# Patient Record
Sex: FEMALE | Race: WHITE | NOT HISPANIC OR LATINO
[De-identification: names, ages, dates, MRNs, and addresses within clinical notes are randomized per-mention and may not be internally consistent; named-entity substitution may affect disease eponyms.]

---

## 2017-02-03 ENCOUNTER — RESULT REVIEW (OUTPATIENT)
Age: 62
End: 2017-02-03

## 2017-05-06 ENCOUNTER — APPOINTMENT (OUTPATIENT)
Dept: INTERNAL MEDICINE | Facility: CLINIC | Age: 62
End: 2017-05-06

## 2017-05-07 LAB
25(OH)D3 SERPL-MCNC: 40.7 NG/ML
ALBUMIN SERPL ELPH-MCNC: 4.6 G/DL
ALP BLD-CCNC: 59 U/L
ALT SERPL-CCNC: 13 U/L
AST SERPL-CCNC: 20 U/L
BASOPHILS # BLD AUTO: 0.01 K/UL
BASOPHILS NFR BLD AUTO: 0.2 %
BILIRUB DIRECT SERPL-MCNC: 0.1 MG/DL
BILIRUB INDIRECT SERPL-MCNC: 0.2 MG/DL
BILIRUB SERPL-MCNC: 0.3 MG/DL
CHOLEST SERPL-MCNC: 234 MG/DL
CHOLEST/HDLC SERPL: 2.6 RATIO
CRP SERPL HS-MCNC: <0.2 MG/L
EOSINOPHIL # BLD AUTO: 0.07 K/UL
EOSINOPHIL NFR BLD AUTO: 1.3 %
HCT VFR BLD CALC: 40 %
HDLC SERPL-MCNC: 91 MG/DL
HGB BLD-MCNC: 13.2 G/DL
IMM GRANULOCYTES NFR BLD AUTO: 0.4 %
LDLC SERPL CALC-MCNC: 136 MG/DL
LYMPHOCYTES # BLD AUTO: 1.61 K/UL
LYMPHOCYTES NFR BLD AUTO: 30.8 %
MAN DIFF?: NORMAL
MCHC RBC-ENTMCNC: 30 PG
MCHC RBC-ENTMCNC: 33 GM/DL
MCV RBC AUTO: 90.9 FL
MONOCYTES # BLD AUTO: 0.42 K/UL
MONOCYTES NFR BLD AUTO: 8 %
NEUTROPHILS # BLD AUTO: 3.1 K/UL
NEUTROPHILS NFR BLD AUTO: 59.3 %
PLATELET # BLD AUTO: 207 K/UL
PROT SERPL-MCNC: 7.3 G/DL
RBC # BLD: 4.4 M/UL
RBC # FLD: 13.7 %
TRIGL SERPL-MCNC: 37 MG/DL
TSH SERPL-ACNC: 2.18 UIU/ML
WBC # FLD AUTO: 5.23 K/UL

## 2017-05-08 ENCOUNTER — OTHER (OUTPATIENT)
Age: 62
End: 2017-05-08

## 2017-08-09 ENCOUNTER — RX RENEWAL (OUTPATIENT)
Age: 62
End: 2017-08-09

## 2017-09-22 ENCOUNTER — APPOINTMENT (OUTPATIENT)
Dept: INTERNAL MEDICINE | Facility: CLINIC | Age: 62
End: 2017-09-22
Payer: COMMERCIAL

## 2017-09-22 VITALS
OXYGEN SATURATION: 99 % | DIASTOLIC BLOOD PRESSURE: 60 MMHG | BODY MASS INDEX: 20.22 KG/M2 | HEART RATE: 60 BPM | SYSTOLIC BLOOD PRESSURE: 80 MMHG | WEIGHT: 103 LBS | HEIGHT: 59.84 IN | TEMPERATURE: 98.5 F

## 2017-09-22 PROCEDURE — 90471 IMMUNIZATION ADMIN: CPT

## 2017-09-22 PROCEDURE — 99396 PREV VISIT EST AGE 40-64: CPT | Mod: 25

## 2017-09-22 PROCEDURE — 90715 TDAP VACCINE 7 YRS/> IM: CPT

## 2017-10-02 ENCOUNTER — APPOINTMENT (OUTPATIENT)
Dept: INTERNAL MEDICINE | Facility: CLINIC | Age: 62
End: 2017-10-02
Payer: COMMERCIAL

## 2017-10-02 PROCEDURE — G0008: CPT

## 2017-10-02 PROCEDURE — 90686 IIV4 VACC NO PRSV 0.5 ML IM: CPT

## 2017-12-09 ENCOUNTER — MOBILE ON CALL (OUTPATIENT)
Age: 62
End: 2017-12-09

## 2017-12-29 DIAGNOSIS — N60.19 DIFFUSE CYSTIC MASTOPATHY OF UNSPECIFIED BREAST: ICD-10-CM

## 2018-03-10 ENCOUNTER — RESULT REVIEW (OUTPATIENT)
Age: 63
End: 2018-03-10

## 2018-06-19 ENCOUNTER — RESULT REVIEW (OUTPATIENT)
Age: 63
End: 2018-06-19

## 2018-08-10 ENCOUNTER — MEDICATION RENEWAL (OUTPATIENT)
Age: 63
End: 2018-08-10

## 2018-10-19 ENCOUNTER — APPOINTMENT (OUTPATIENT)
Dept: INTERNAL MEDICINE | Facility: CLINIC | Age: 63
End: 2018-10-19
Payer: COMMERCIAL

## 2018-10-19 VITALS — WEIGHT: 104 LBS | BODY MASS INDEX: 21.01 KG/M2

## 2018-10-19 VITALS
TEMPERATURE: 97.9 F | HEART RATE: 74 BPM | OXYGEN SATURATION: 97 % | HEIGHT: 59 IN | DIASTOLIC BLOOD PRESSURE: 56 MMHG | SYSTOLIC BLOOD PRESSURE: 84 MMHG

## 2018-10-19 PROCEDURE — G0008: CPT

## 2018-10-19 PROCEDURE — 90686 IIV4 VACC NO PRSV 0.5 ML IM: CPT

## 2018-10-19 PROCEDURE — 99396 PREV VISIT EST AGE 40-64: CPT | Mod: 25

## 2018-10-19 PROCEDURE — 36415 COLL VENOUS BLD VENIPUNCTURE: CPT

## 2018-10-19 RX ORDER — SCOPALAMINE 1 MG/3D
1 PATCH, EXTENDED RELEASE TRANSDERMAL
Qty: 2 | Refills: 0 | Status: DISCONTINUED | COMMUNITY
Start: 2018-06-19 | End: 2018-10-19

## 2018-10-19 NOTE — PHYSICAL EXAM
[No Acute Distress] : no acute distress [Well-Appearing] : well-appearing [Normal Sclera/Conjunctiva] : normal sclera/conjunctiva [Normal Outer Ear/Nose] : the outer ears and nose were normal in appearance [Normal TMs] : both tympanic membranes were normal [Supple] : supple [No Respiratory Distress] : no respiratory distress  [Clear to Auscultation] : lungs were clear to auscultation bilaterally [Normal Rate] : normal rate  [Regular Rhythm] : with a regular rhythm [Normal S1, S2] : normal S1 and S2 [No Murmur] : no murmur heard [No Edema] : there was no peripheral edema [Soft] : abdomen soft [Non Tender] : non-tender [Normal Supraclavicular Nodes] : no supraclavicular lymphadenopathy [Normal Anterior Cervical Nodes] : no anterior cervical lymphadenopathy [Normal Affect] : the affect was normal [Normal Mood] : the mood was normal [de-identified] : fair and freckled

## 2018-10-19 NOTE — ASSESSMENT
[FreeTextEntry1] : Healthy 64 y/o female for CPE\par \par Labs today, flu shot today\par \par Advised derm for SBE\par \par up to date on CLS, mammo\par \par \par yearly sono for hemangioma and angiomyolipoma.

## 2018-10-19 NOTE — HISTORY OF PRESENT ILLNESS
[FreeTextEntry1] : Here for cpe [de-identified] : No complaints. would like a flu shot\par \par Had bita sofia in Feb 2018\par \par No change in hepatic hemangioma. was found to have a angiomyolipoma on the left, unchanged

## 2018-10-22 LAB
25(OH)D3 SERPL-MCNC: 45.3 NG/ML
ALBUMIN SERPL ELPH-MCNC: 4.9 G/DL
ALP BLD-CCNC: 63 U/L
ALT SERPL-CCNC: 24 U/L
ANION GAP SERPL CALC-SCNC: 15 MMOL/L
AST SERPL-CCNC: 23 U/L
BASOPHILS # BLD AUTO: 0.02 K/UL
BASOPHILS NFR BLD AUTO: 0.3 %
BILIRUB SERPL-MCNC: 0.3 MG/DL
BUN SERPL-MCNC: 14 MG/DL
CALCIUM SERPL-MCNC: 10.3 MG/DL
CHLORIDE SERPL-SCNC: 106 MMOL/L
CHOLEST SERPL-MCNC: 249 MG/DL
CHOLEST/HDLC SERPL: 2.7 RATIO
CO2 SERPL-SCNC: 26 MMOL/L
CREAT SERPL-MCNC: 0.66 MG/DL
EOSINOPHIL # BLD AUTO: 0.07 K/UL
EOSINOPHIL NFR BLD AUTO: 1.1 %
GLUCOSE SERPL-MCNC: 120 MG/DL
HBA1C MFR BLD HPLC: 5.6 %
HCT VFR BLD CALC: 40.8 %
HDLC SERPL-MCNC: 92 MG/DL
HGB BLD-MCNC: 13.5 G/DL
IMM GRANULOCYTES NFR BLD AUTO: 0.3 %
LDLC SERPL CALC-MCNC: 148 MG/DL
LYMPHOCYTES # BLD AUTO: 1.44 K/UL
LYMPHOCYTES NFR BLD AUTO: 22.8 %
MAN DIFF?: NORMAL
MCHC RBC-ENTMCNC: 29.8 PG
MCHC RBC-ENTMCNC: 33.1 GM/DL
MCV RBC AUTO: 90.1 FL
MONOCYTES # BLD AUTO: 0.27 K/UL
MONOCYTES NFR BLD AUTO: 4.3 %
NEUTROPHILS # BLD AUTO: 4.5 K/UL
NEUTROPHILS NFR BLD AUTO: 71.2 %
PLATELET # BLD AUTO: 231 K/UL
POTASSIUM SERPL-SCNC: 4.8 MMOL/L
PROT SERPL-MCNC: 7.3 G/DL
RBC # BLD: 4.53 M/UL
RBC # FLD: 13.8 %
SODIUM SERPL-SCNC: 147 MMOL/L
TRIGL SERPL-MCNC: 47 MG/DL
TSH SERPL-ACNC: 1.89 UIU/ML
WBC # FLD AUTO: 6.32 K/UL

## 2019-01-05 ENCOUNTER — APPOINTMENT (OUTPATIENT)
Dept: INTERNAL MEDICINE | Facility: CLINIC | Age: 64
End: 2019-01-05
Payer: COMMERCIAL

## 2019-01-05 PROCEDURE — 86580 TB INTRADERMAL TEST: CPT

## 2019-06-28 ENCOUNTER — OTHER (OUTPATIENT)
Age: 64
End: 2019-06-28

## 2019-07-05 ENCOUNTER — TRANSCRIPTION ENCOUNTER (OUTPATIENT)
Age: 64
End: 2019-07-05

## 2019-07-06 ENCOUNTER — EMERGENCY (EMERGENCY)
Facility: HOSPITAL | Age: 64
LOS: 1 days | Discharge: HOME | End: 2019-07-06
Attending: EMERGENCY MEDICINE
Payer: COMMERCIAL

## 2019-07-06 VITALS
DIASTOLIC BLOOD PRESSURE: 75 MMHG | WEIGHT: 104.94 LBS | RESPIRATION RATE: 18 BRPM | TEMPERATURE: 98 F | HEART RATE: 69 BPM | SYSTOLIC BLOOD PRESSURE: 120 MMHG | OXYGEN SATURATION: 99 % | HEIGHT: 60 IN

## 2019-07-06 LAB
ALBUMIN SERPL ELPH-MCNC: 4.4 G/DL — SIGNIFICANT CHANGE UP (ref 3.3–5)
ALP SERPL-CCNC: 58 U/L — SIGNIFICANT CHANGE UP (ref 40–120)
ALT FLD-CCNC: 12 U/L — SIGNIFICANT CHANGE UP (ref 10–45)
ANION GAP SERPL CALC-SCNC: 16 MMOL/L — SIGNIFICANT CHANGE UP (ref 5–17)
AST SERPL-CCNC: 15 U/L — SIGNIFICANT CHANGE UP (ref 10–40)
BASOPHILS # BLD AUTO: 0 K/UL — SIGNIFICANT CHANGE UP (ref 0–0.2)
BASOPHILS NFR BLD AUTO: 0.2 % — SIGNIFICANT CHANGE UP (ref 0–2)
BILIRUB SERPL-MCNC: 0.2 MG/DL — SIGNIFICANT CHANGE UP (ref 0.2–1.2)
BUN SERPL-MCNC: 20 MG/DL — SIGNIFICANT CHANGE UP (ref 7–23)
CALCIUM SERPL-MCNC: 9.6 MG/DL — SIGNIFICANT CHANGE UP (ref 8.4–10.5)
CHLORIDE SERPL-SCNC: 101 MMOL/L — SIGNIFICANT CHANGE UP (ref 96–108)
CO2 SERPL-SCNC: 21 MMOL/L — LOW (ref 22–31)
CREAT SERPL-MCNC: 0.84 MG/DL — SIGNIFICANT CHANGE UP (ref 0.5–1.3)
EOSINOPHIL # BLD AUTO: 0.1 K/UL — SIGNIFICANT CHANGE UP (ref 0–0.5)
EOSINOPHIL NFR BLD AUTO: 1.1 % — SIGNIFICANT CHANGE UP (ref 0–6)
GLUCOSE SERPL-MCNC: 132 MG/DL — HIGH (ref 70–99)
HCT VFR BLD CALC: 39.7 % — SIGNIFICANT CHANGE UP (ref 34.5–45)
HGB BLD-MCNC: 13.5 G/DL — SIGNIFICANT CHANGE UP (ref 11.5–15.5)
LYMPHOCYTES # BLD AUTO: 1.4 K/UL — SIGNIFICANT CHANGE UP (ref 1–3.3)
LYMPHOCYTES # BLD AUTO: 20.9 % — SIGNIFICANT CHANGE UP (ref 13–44)
MAGNESIUM SERPL-MCNC: 1.8 MG/DL — SIGNIFICANT CHANGE UP (ref 1.6–2.6)
MCHC RBC-ENTMCNC: 29.9 PG — SIGNIFICANT CHANGE UP (ref 27–34)
MCHC RBC-ENTMCNC: 33.9 GM/DL — SIGNIFICANT CHANGE UP (ref 32–36)
MCV RBC AUTO: 88.2 FL — SIGNIFICANT CHANGE UP (ref 80–100)
MONOCYTES # BLD AUTO: 0.4 K/UL — SIGNIFICANT CHANGE UP (ref 0–0.9)
MONOCYTES NFR BLD AUTO: 5.6 % — SIGNIFICANT CHANGE UP (ref 2–14)
NEUTROPHILS # BLD AUTO: 4.9 K/UL — SIGNIFICANT CHANGE UP (ref 1.8–7.4)
NEUTROPHILS NFR BLD AUTO: 72.1 % — SIGNIFICANT CHANGE UP (ref 43–77)
PHOSPHATE SERPL-MCNC: 3.7 MG/DL — SIGNIFICANT CHANGE UP (ref 2.5–4.5)
PLATELET # BLD AUTO: 229 K/UL — SIGNIFICANT CHANGE UP (ref 150–400)
POTASSIUM SERPL-MCNC: 3.9 MMOL/L — SIGNIFICANT CHANGE UP (ref 3.5–5.3)
POTASSIUM SERPL-SCNC: 3.9 MMOL/L — SIGNIFICANT CHANGE UP (ref 3.5–5.3)
PROT SERPL-MCNC: 6.9 G/DL — SIGNIFICANT CHANGE UP (ref 6–8.3)
RBC # BLD: 4.5 M/UL — SIGNIFICANT CHANGE UP (ref 3.8–5.2)
RBC # FLD: 12.1 % — SIGNIFICANT CHANGE UP (ref 10.3–14.5)
SODIUM SERPL-SCNC: 138 MMOL/L — SIGNIFICANT CHANGE UP (ref 135–145)
TROPONIN T, HIGH SENSITIVITY RESULT: <6 NG/L — SIGNIFICANT CHANGE UP (ref 0–51)
WBC # BLD: 6.8 K/UL — SIGNIFICANT CHANGE UP (ref 3.8–10.5)
WBC # FLD AUTO: 6.8 K/UL — SIGNIFICANT CHANGE UP (ref 3.8–10.5)

## 2019-07-06 PROCEDURE — 93010 ELECTROCARDIOGRAM REPORT: CPT

## 2019-07-06 PROCEDURE — 83735 ASSAY OF MAGNESIUM: CPT

## 2019-07-06 PROCEDURE — 99284 EMERGENCY DEPT VISIT MOD MDM: CPT | Mod: 25

## 2019-07-06 PROCEDURE — 14060 TIS TRNFR E/N/E/L 10 SQ CM/<: CPT

## 2019-07-06 PROCEDURE — 84100 ASSAY OF PHOSPHORUS: CPT

## 2019-07-06 PROCEDURE — 80053 COMPREHEN METABOLIC PANEL: CPT

## 2019-07-06 PROCEDURE — 84484 ASSAY OF TROPONIN QUANT: CPT

## 2019-07-06 PROCEDURE — 93005 ELECTROCARDIOGRAM TRACING: CPT | Mod: XU

## 2019-07-06 PROCEDURE — 85027 COMPLETE CBC AUTOMATED: CPT

## 2019-07-06 PROCEDURE — 99285 EMERGENCY DEPT VISIT HI MDM: CPT | Mod: 25

## 2019-07-06 NOTE — ED ADULT NURSE NOTE - NSSUSCREENINGQ2_ED_ALL_ED
1) Continue Advair 500/50, Spiriva, Albuterol per neb and HFA inhaler.    2) RX for Levaquin 500 mg 1 po daily x 10 days and Prednisone taper to have on hand.    3) Continue to follow with Cardiology for diuretic management. Request consult records from Dr. Pierce, Cardiology.   4) RX for Cheratussin A/C 5 ml po q 6 hours prn cough to have on hand.    5) Continue 02 3 LPM 24/7, 5 LPM with exertion.  6) Ongoing weight loss efforts encourage. Encouraged routine walking/exercise.  7) She does have a flutter valve on hand to help with mucous clearance.  8) Continue CPAP at 12 CM H20 with 3 LPM 02 bleed in. Order to DME for new mask and supplies. Compliance chip at follow up.  9) She is up to date on her Prevnar 13, Pneumovax 23 and Influence vaccines.   10) Follow up in 6 months, sooner if needed.    
No

## 2019-07-06 NOTE — ED PROVIDER NOTE - CARE PLAN
Principal Discharge DX:	Lip laceration, initial encounter  Secondary Diagnosis:	Syncope, unspecified syncope type

## 2019-07-06 NOTE — ED PROVIDER NOTE - CLINICAL SUMMARY MEDICAL DECISION MAKING FREE TEXT BOX
65yo woman PMH hypothyroidism presents with a syncopal episode with fall and injury to nose and lip with no other injuries or evidence of head injury or neurologic deficits or injury to any joints. Likely vasovagal syncopal episode based upon history of syncope in similar situations but will keep on cardiac monitor, assess h/h, electrolytes, and low suspicion for acs but will obtain trop to assess. Will have plastics repair lip laceration. No evidence of expanding nasal hematoma but likely fractured with no current need for intervention at this time. Low suspicion for intracranial bleed or injury with no neurologic deficits on exam.

## 2019-07-06 NOTE — ED PROVIDER NOTE - NSFOLLOWUPINSTRUCTIONS_ED_ALL_ED_FT
Please follow up with Dr. Singh on 7/11/19    You can take tylenol 500mg every 6 hours as needed for pain. You can also take 400mg of ibuprofen every 6 hours as needed for pain, make sure to take with food.    Clean area once a day with soap and warm water, apply ointment until sutures are removed.    If you experience bleeding, hold pressure for 20 minutes without lifting and repeat if continues to bleed. If it continues to bleed after 2 rounds of pressure, seek medical attention.    Return to the ED for any worsening symptoms of fevers/chills, expanding redness, warmth, and worsening pain, purulent discharge, or any new or concerning symptoms.    Please read all attached.

## 2019-07-06 NOTE — ED PROVIDER NOTE - NS ED ROS FT
General: no fevers or chills  Head: no headache  Eyes: no vision change  ENT: +resolved epistaxis, no neck pain  CV: no chest pain, no palpitations  Resp: no SOB  GI: +resolved abdominal cramping, +1 episode of diarrhea, no n/v  : no dysuria  MSK: no joint pain  Skin: +bruising on nose, +laceration on left lower lip  Neuro: no focal weakness, no change in sensation

## 2019-07-06 NOTE — ED PROVIDER NOTE - OBJECTIVE STATEMENT
63yo woman PMH hypothyroidism presents after a syncopal episode. She sustained an injury to her lower lip, nose, and chin. Pt reports she has a history of syncope when she feels GI upset and has abdominal cramps. She felt similarly today and had lower abdominal cramping and when she got out of the car she had a syncopal event for a few seconds and was able to get up and go to the bathroom when she had an episode of diarrhea. She reports complete resolution of abdominal cramping afterwards. She denies headache, vision changes, slurred speech, weakness or change in sensation of limbs, n/v. no neck pain or back pain, no extremity/joint pain. she denies chest pain, palpitations, or SOB, denies confusion after she woke up or incontinence. 65yo woman PMH hypothyroidism presents after a syncopal episode. She sustained an injury to her lower lip, nose, and chin. Pt reports she has a history of syncope when she feels GI upset and has abdominal cramps. She felt similarly today and had lower abdominal cramping and when she got out of the car she had a syncopal event for a few seconds and was able to get up and go to the bathroom when she had an episode of diarrhea. She reports complete resolution of abdominal cramping afterwards. She denies headache, vision changes, slurred speech, weakness or change in sensation of limbs, n/v. no neck pain or back pain, no extremity/joint pain. she denies chest pain, palpitations, or SOB, denies confusion after she woke up or incontinence.  Pt's tetanus is up to date.

## 2019-07-06 NOTE — ED PROVIDER NOTE - PHYSICAL EXAMINATION
General: well-appearing woman in no acute distress  Head: normocephalic, atraumatic  Eyes: PERRL, EOMI  Nose: bruising and swelling of nasal bridge with clotted blood at nares  Mouth: moist mucous membranes, no missing or loose teeth, no oral or tongue lesions, 8mm left lower lip lesion wrapping anterior and posterior and minimally crosses vermillion border  Neck: supple neck, no Cspine tenderness to palpation  CV: normal rate and rhythm, normal S1 and S2  Respiratory: clear to auscultation bilaterally  Abdomen: soft, nontender, nondistended  Back: no midline tenderness to palpation, no CVAT  Neuro: alert and oriented x3, CN II-XII intact, speech clear, no pronator drift, strength 5/5 UE and LE bilaterally, sensation equal and intact bilaterally  Skin: lip laceration and nasal bruising  Extremities: full active ROM of all extremity joints with no tenderness to palpation, peripheral pulses 2+ bilaterally

## 2019-07-06 NOTE — ED PROVIDER NOTE - ATTENDING CONTRIBUTION TO CARE
64y F hx of hypothyroid on synthroid here with syncopal episode in context of stomach pain. Pt states she was in usual state of health, went to dinner, on way home developed lower abd stomach cramps. States has had vasovagal syncope in context of abd pain many times in past. Got out of car and passed out in garage striking face on concrete floor. Was unconscious for only seconds. No HA, vision change, neck pain. 64y F hx of hypothyroid on synthroid here with syncopal episode in context of stomach pain. Pt states she was in usual state of health, went to dinner, on way home developed lower abd stomach cramps. States has had vasovagal syncope in context of abd pain many times in past. Got out of car and passed out in garage striking face on concrete floor. Was unconscious for only seconds. Got self up and ambulatory since, Here with c/o lip lac. No HA, vision change, neck pain. No CP, SOB, abd pain resolved after episode of diarrhea at home. No NV.   Gen: WNWD NAD  HEENT: NC laceration to midline lower lip thru vermillion border ecchymosis and swelling to bridge nose, blood from bl nares no septal hematoma PERRL EOMI normal pharynx  Neck: supple  Back: no midline TTP   CV: RRR, no murmur  Lung: CTA BL  Abd: +BS soft NTND  Ext: wwp, palp pulses, FROMx4, no cce  Neuro: A&Ox3, CN grossly intact, sensation intact, motor 5/5 throughout  AP: 64y F hx hypothyroid, vasovagal syncope here with syncope and lip lac, again suspect vasovagal. Shared decision making w pt and spouse regarding defer CT head and MF at this time. Pt not on AC, no alteration in consciousness. Advised that we suspect nasal bone fracture, but nothing to do about it acutely, can FU with plastics as OP. Pt requests plastics for lip lac closure. Tdap is UTD. Will check EKG, labs, IVF. Re-eval. Head injury return precautions given.

## 2019-07-06 NOTE — ED ADULT NURSE NOTE - OBJECTIVE STATEMENT
Pt is 64 Y A&O x3 F with hx of hypothyroidism presenting to ED s/p syncope. Pt states she felt a stomach cramp when in the garage when she became lightheaded and fe.. Pt is 64 Y A&O x3 F with hx of hypothyroidism presenting to ED s/p syncope. Pt states she felt a stomach cramp when walking in the garage when she became lightheaded and fell. + LOC "for a couple of seconds." Pt reports similar episodes in the past. Pt denies anti-coagulant use, headache, dizziness, blurry vision, N/V/D, CP, SOB. Pt breathing unlabored on RA. PERRL. Sensation intact. Pt able to move UE and LE b/l. + ecchymosis and swelling noted to nose. + lip lac with blood at site, no purulent drainage noted. Ice pack provided to L chin for 2/10 pain. Skin is warm and dry. Safety and comfort maintained. 20 G inserted to R AC . Pt placed on cardiac monitor and continuous pulse ox. Family at the bedside.

## 2019-07-06 NOTE — ED PROVIDER NOTE - CARE PROVIDER_API CALL
Alexis Singh (MD)  Plastic Surgery  [address], [address]  [place] [address]  Phone: [phone]  Fax: [phone]  Follow Up Time:

## 2019-07-06 NOTE — ED PROVIDER NOTE - PROGRESS NOTE DETAILS
Nanette Hernández, resident MD: spoke with Dr. Singh to consult for lip repair and will see pt. Nanette Hernández, resident MD: lip was repaired by plastics and scheduled for f/u in 5 days for check. will discharge patient home at this time. printed out copies of results for patient to take home. discussed return precautions and need for outpatient follow up.

## 2019-07-07 VITALS
OXYGEN SATURATION: 98 % | DIASTOLIC BLOOD PRESSURE: 54 MMHG | SYSTOLIC BLOOD PRESSURE: 103 MMHG | RESPIRATION RATE: 17 BRPM | TEMPERATURE: 98 F | HEART RATE: 68 BPM

## 2019-07-08 ENCOUNTER — OTHER (OUTPATIENT)
Age: 64
End: 2019-07-08

## 2019-08-21 PROBLEM — E03.9 HYPOTHYROIDISM, UNSPECIFIED: Chronic | Status: ACTIVE | Noted: 2019-07-06

## 2019-09-05 LAB — BACTERIA UR CULT: NORMAL

## 2019-10-11 ENCOUNTER — APPOINTMENT (OUTPATIENT)
Dept: INTERNAL MEDICINE | Facility: CLINIC | Age: 64
End: 2019-10-11
Payer: COMMERCIAL

## 2019-10-11 PROCEDURE — 90686 IIV4 VACC NO PRSV 0.5 ML IM: CPT

## 2019-10-11 PROCEDURE — G0008: CPT

## 2019-10-19 ENCOUNTER — RESULT REVIEW (OUTPATIENT)
Age: 64
End: 2019-10-19

## 2019-11-08 ENCOUNTER — APPOINTMENT (OUTPATIENT)
Dept: INTERNAL MEDICINE | Facility: CLINIC | Age: 64
End: 2019-11-08
Payer: COMMERCIAL

## 2019-11-08 VITALS
SYSTOLIC BLOOD PRESSURE: 105 MMHG | WEIGHT: 106 LBS | OXYGEN SATURATION: 97 % | HEIGHT: 59 IN | DIASTOLIC BLOOD PRESSURE: 72 MMHG | TEMPERATURE: 97.9 F | BODY MASS INDEX: 21.37 KG/M2 | HEART RATE: 72 BPM

## 2019-11-08 DIAGNOSIS — R55 SYNCOPE AND COLLAPSE: ICD-10-CM

## 2019-11-08 PROCEDURE — 36415 COLL VENOUS BLD VENIPUNCTURE: CPT

## 2019-11-08 PROCEDURE — 99396 PREV VISIT EST AGE 40-64: CPT | Mod: 25

## 2019-11-08 RX ORDER — SULFAMETHOXAZOLE AND TRIMETHOPRIM 800; 160 MG/1; MG/1
800-160 TABLET ORAL TWICE DAILY
Qty: 14 | Refills: 0 | Status: DISCONTINUED | COMMUNITY
Start: 2019-09-02 | End: 2019-11-08

## 2019-11-08 NOTE — HISTORY OF PRESENT ILLNESS
[FreeTextEntry1] : CPE [de-identified] : \par \par  had complications after colon cancer diagnosed at 62, patient had a syncopal episode when they recived the news.  \par \par Patient had a normal colonoscopy since with Dr. gant\par \par Had flu shot already\par \par Due for DEXA, up to date with mammo

## 2019-11-08 NOTE — PHYSICAL EXAM
[No Acute Distress] : no acute distress [Well-Appearing] : well-appearing [Normal Sclera/Conjunctiva] : normal sclera/conjunctiva [Normal Oropharynx] : the oropharynx was normal [No Respiratory Distress] : no respiratory distress  [Thyroid Normal, No Nodules] : the thyroid was normal and there were no nodules present [No Lymphadenopathy] : no lymphadenopathy [Normal Rate] : normal rate  [Clear to Auscultation] : lungs were clear to auscultation bilaterally [Regular Rhythm] : with a regular rhythm [Normal S1, S2] : normal S1 and S2 [No Murmur] : no murmur heard [No Edema] : there was no peripheral edema [Soft] : abdomen soft [Normal Anterior Cervical Nodes] : no anterior cervical lymphadenopathy [Non Tender] : non-tender [Normal Supraclavicular Nodes] : no supraclavicular lymphadenopathy [Normal Gait] : normal gait [No Rash] : no rash [Normal Mood] : the mood was normal [Normal Affect] : the affect was normal

## 2019-11-08 NOTE — ASSESSMENT
[FreeTextEntry1] : Healthy 65y/o female for CPE\par \par Labs today\par \par up to date on CLS, mammo, flu\par \par yearly sono for hemangioma and angiomyolipoma.  \par \par Needs DEXA\par \par 2 vasovagal episodes this year with clear triggers, careful positioning and awareness of prodrome discussed.

## 2019-11-10 ENCOUNTER — MOBILE ON CALL (OUTPATIENT)
Age: 64
End: 2019-11-10

## 2019-11-11 LAB
25(OH)D3 SERPL-MCNC: 45.5 NG/ML
ALBUMIN SERPL ELPH-MCNC: 5 G/DL
ALP BLD-CCNC: 65 U/L
ALT SERPL-CCNC: 24 U/L
ANION GAP SERPL CALC-SCNC: 15 MMOL/L
AST SERPL-CCNC: 23 U/L
BASOPHILS # BLD AUTO: 0.03 K/UL
BASOPHILS NFR BLD AUTO: 0.4 %
BILIRUB SERPL-MCNC: 0.2 MG/DL
BUN SERPL-MCNC: 13 MG/DL
CALCIUM SERPL-MCNC: 10 MG/DL
CHLORIDE SERPL-SCNC: 103 MMOL/L
CHOLEST SERPL-MCNC: 253 MG/DL
CHOLEST/HDLC SERPL: 3 RATIO
CO2 SERPL-SCNC: 24 MMOL/L
CREAT SERPL-MCNC: 0.62 MG/DL
EOSINOPHIL # BLD AUTO: 0.09 K/UL
EOSINOPHIL NFR BLD AUTO: 1.3 %
GLUCOSE SERPL-MCNC: 84 MG/DL
HCT VFR BLD CALC: 42.1 %
HDLC SERPL-MCNC: 85 MG/DL
HGB BLD-MCNC: 13.5 G/DL
IMM GRANULOCYTES NFR BLD AUTO: 0.3 %
LDLC SERPL CALC-MCNC: 157 MG/DL
LYMPHOCYTES # BLD AUTO: 1.76 K/UL
LYMPHOCYTES NFR BLD AUTO: 25.7 %
MAN DIFF?: NORMAL
MCHC RBC-ENTMCNC: 28.9 PG
MCHC RBC-ENTMCNC: 32.1 GM/DL
MCV RBC AUTO: 90.1 FL
MONOCYTES # BLD AUTO: 0.47 K/UL
MONOCYTES NFR BLD AUTO: 6.9 %
NEUTROPHILS # BLD AUTO: 4.49 K/UL
NEUTROPHILS NFR BLD AUTO: 65.4 %
PLATELET # BLD AUTO: 246 K/UL
POTASSIUM SERPL-SCNC: 4.6 MMOL/L
PROT SERPL-MCNC: 7 G/DL
RBC # BLD: 4.67 M/UL
RBC # FLD: 13.5 %
SODIUM SERPL-SCNC: 142 MMOL/L
TRIGL SERPL-MCNC: 55 MG/DL
TSH SERPL-ACNC: 3.78 UIU/ML
WBC # FLD AUTO: 6.86 K/UL

## 2019-11-12 ENCOUNTER — OTHER (OUTPATIENT)
Age: 64
End: 2019-11-12

## 2019-11-21 ENCOUNTER — FORM ENCOUNTER (OUTPATIENT)
Age: 64
End: 2019-11-21

## 2019-11-22 ENCOUNTER — OUTPATIENT (OUTPATIENT)
Dept: OUTPATIENT SERVICES | Facility: HOSPITAL | Age: 64
LOS: 1 days | End: 2019-11-22
Payer: COMMERCIAL

## 2019-11-22 ENCOUNTER — APPOINTMENT (OUTPATIENT)
Dept: ULTRASOUND IMAGING | Facility: HOSPITAL | Age: 64
End: 2019-11-22
Payer: COMMERCIAL

## 2019-11-22 DIAGNOSIS — E78.5 HYPERLIPIDEMIA, UNSPECIFIED: ICD-10-CM

## 2019-11-22 PROCEDURE — 93880 EXTRACRANIAL BILAT STUDY: CPT | Mod: 26

## 2019-11-22 PROCEDURE — 93880 EXTRACRANIAL BILAT STUDY: CPT

## 2019-12-01 ENCOUNTER — RX RENEWAL (OUTPATIENT)
Age: 64
End: 2019-12-01

## 2019-12-02 ENCOUNTER — RX RENEWAL (OUTPATIENT)
Age: 64
End: 2019-12-02

## 2019-12-05 ENCOUNTER — FORM ENCOUNTER (OUTPATIENT)
Age: 64
End: 2019-12-05

## 2019-12-06 ENCOUNTER — OUTPATIENT (OUTPATIENT)
Dept: OUTPATIENT SERVICES | Facility: HOSPITAL | Age: 64
LOS: 1 days | End: 2019-12-06
Payer: COMMERCIAL

## 2019-12-06 ENCOUNTER — APPOINTMENT (OUTPATIENT)
Dept: CARDIOLOGY | Facility: CLINIC | Age: 64
End: 2019-12-06

## 2019-12-06 DIAGNOSIS — R07.9 CHEST PAIN, UNSPECIFIED: ICD-10-CM

## 2019-12-06 PROCEDURE — 75574 CT ANGIO HRT W/3D IMAGE: CPT

## 2019-12-06 PROCEDURE — 75574 CT ANGIO HRT W/3D IMAGE: CPT | Mod: 26

## 2019-12-22 ENCOUNTER — RX CHANGE (OUTPATIENT)
Age: 64
End: 2019-12-22

## 2019-12-30 ENCOUNTER — APPOINTMENT (OUTPATIENT)
Dept: INTERNAL MEDICINE | Facility: CLINIC | Age: 64
End: 2019-12-30
Payer: COMMERCIAL

## 2019-12-30 PROCEDURE — 86580 TB INTRADERMAL TEST: CPT

## 2020-03-16 LAB
ALBUMIN SERPL ELPH-MCNC: 4.7 G/DL
ALP BLD-CCNC: 63 U/L
ALT SERPL-CCNC: 23 U/L
ANION GAP SERPL CALC-SCNC: 13 MMOL/L
AST SERPL-CCNC: 25 U/L
BILIRUB SERPL-MCNC: 0.3 MG/DL
BUN SERPL-MCNC: 16 MG/DL
CALCIUM SERPL-MCNC: 9.6 MG/DL
CHLORIDE SERPL-SCNC: 105 MMOL/L
CHOLEST SERPL-MCNC: 162 MG/DL
CHOLEST/HDLC SERPL: 2.1 RATIO
CK SERPL-CCNC: 74 U/L
CO2 SERPL-SCNC: 24 MMOL/L
CREAT SERPL-MCNC: 0.75 MG/DL
GLUCOSE SERPL-MCNC: 103 MG/DL
HDLC SERPL-MCNC: 77 MG/DL
LDLC SERPL CALC-MCNC: 77 MG/DL
POTASSIUM SERPL-SCNC: 4.8 MMOL/L
PROT SERPL-MCNC: 6.8 G/DL
SODIUM SERPL-SCNC: 143 MMOL/L
TRIGL SERPL-MCNC: 38 MG/DL

## 2020-05-15 ENCOUNTER — MESSAGE (OUTPATIENT)
Age: 65
End: 2020-05-15

## 2020-05-15 ENCOUNTER — APPOINTMENT (OUTPATIENT)
Dept: DISASTER EMERGENCY | Facility: CLINIC | Age: 65
End: 2020-05-15

## 2020-05-16 ENCOUNTER — NON-APPOINTMENT (OUTPATIENT)
Age: 65
End: 2020-05-16

## 2020-05-16 LAB
SARS-COV-2 IGG SERPL IA-ACNC: 5.3 AU/ML
SARS-COV-2 IGG SERPL QL IA: NEGATIVE

## 2020-05-18 ENCOUNTER — NON-APPOINTMENT (OUTPATIENT)
Age: 65
End: 2020-05-18

## 2020-05-19 ENCOUNTER — RESULT CHARGE (OUTPATIENT)
Age: 65
End: 2020-05-19

## 2020-05-20 ENCOUNTER — APPOINTMENT (OUTPATIENT)
Dept: CT IMAGING | Facility: IMAGING CENTER | Age: 65
End: 2020-05-20
Payer: COMMERCIAL

## 2020-05-20 ENCOUNTER — OUTPATIENT (OUTPATIENT)
Dept: OUTPATIENT SERVICES | Facility: HOSPITAL | Age: 65
LOS: 1 days | End: 2020-05-20
Payer: COMMERCIAL

## 2020-05-20 ENCOUNTER — RESULT REVIEW (OUTPATIENT)
Age: 65
End: 2020-05-20

## 2020-05-20 DIAGNOSIS — R31.9 HEMATURIA, UNSPECIFIED: ICD-10-CM

## 2020-05-20 LAB — BACTERIA UR CULT: NORMAL

## 2020-05-20 PROCEDURE — 74178 CT ABD&PLV WO CNTR FLWD CNTR: CPT | Mod: 26

## 2020-05-20 PROCEDURE — 74178 CT ABD&PLV WO CNTR FLWD CNTR: CPT

## 2020-05-20 PROCEDURE — 82565 ASSAY OF CREATININE: CPT

## 2020-05-21 ENCOUNTER — LABORATORY RESULT (OUTPATIENT)
Age: 65
End: 2020-05-21

## 2020-05-21 LAB — URINE CYTOLOGY: NORMAL

## 2020-05-22 ENCOUNTER — APPOINTMENT (OUTPATIENT)
Dept: UROLOGY | Facility: CLINIC | Age: 65
End: 2020-05-22
Payer: COMMERCIAL

## 2020-05-22 ENCOUNTER — OUTPATIENT (OUTPATIENT)
Dept: OUTPATIENT SERVICES | Facility: HOSPITAL | Age: 65
LOS: 1 days | End: 2020-05-22
Payer: COMMERCIAL

## 2020-05-22 ENCOUNTER — APPOINTMENT (OUTPATIENT)
Age: 65
End: 2020-05-22

## 2020-05-22 VITALS
RESPIRATION RATE: 16 BRPM | SYSTOLIC BLOOD PRESSURE: 114 MMHG | HEART RATE: 80 BPM | BODY MASS INDEX: 20.03 KG/M2 | HEIGHT: 60 IN | TEMPERATURE: 98.2 F | DIASTOLIC BLOOD PRESSURE: 69 MMHG | WEIGHT: 102 LBS

## 2020-05-22 DIAGNOSIS — Z86.39 PERSONAL HISTORY OF OTHER ENDOCRINE, NUTRITIONAL AND METABOLIC DISEASE: ICD-10-CM

## 2020-05-22 DIAGNOSIS — Z82.49 FAMILY HISTORY OF ISCHEMIC HEART DISEASE AND OTHER DISEASES OF THE CIRCULATORY SYSTEM: ICD-10-CM

## 2020-05-22 LAB
APPEARANCE: ABNORMAL
BACTERIA: NEGATIVE
BILIRUBIN URINE: NEGATIVE
BLOOD URINE: ABNORMAL
COLOR: ABNORMAL
GLUCOSE QUALITATIVE U: NEGATIVE
HYALINE CASTS: 0 /LPF
KETONES URINE: NEGATIVE
LEUKOCYTE ESTERASE URINE: NEGATIVE
MICROSCOPIC-UA: NORMAL
NITRITE URINE: POSITIVE
PH URINE: 7
PROTEIN URINE: ABNORMAL
RED BLOOD CELLS URINE: >720 /HPF
SPECIFIC GRAVITY URINE: 1.01
SQUAMOUS EPITHELIAL CELLS: 4 /HPF
UROBILINOGEN URINE: NORMAL
WHITE BLOOD CELLS URINE: 10 /HPF

## 2020-05-22 PROCEDURE — 99203 OFFICE O/P NEW LOW 30 MIN: CPT | Mod: 25

## 2020-05-22 PROCEDURE — 52000 CYSTOURETHROSCOPY: CPT

## 2020-05-22 NOTE — PHYSICAL EXAM
[Normal Appearance] : normal appearance [General Appearance - Well Nourished] : well nourished [General Appearance - Well Developed] : well developed [Edema] : no peripheral edema [General Appearance - In No Acute Distress] : no acute distress [Well Groomed] : well groomed [Abdomen Soft] : soft [Exaggerated Use Of Accessory Muscles For Inspiration] : no accessory muscle use [Respiration, Rhythm And Depth] : normal respiratory rhythm and effort [Costovertebral Angle Tenderness] : no ~M costovertebral angle tenderness [Abdomen Tenderness] : non-tender [External Female Genitalia] : normal external genitalia [Urethral Meatus] : normal urethra [Anus Abnormality] : the anus and perineum were normal [Urinary Bladder Findings] : the bladder was normal on palpation [FreeTextEntry1] : Courtney briones [] : no rash [Normal Station and Gait] : the gait and station were normal for the patient's age [Mood] : the mood was normal [Oriented To Time, Place, And Person] : oriented to person, place, and time [Affect] : the affect was normal [No Focal Deficits] : no focal deficits [Not Anxious] : not anxious

## 2020-05-22 NOTE — REVIEW OF SYSTEMS
[Negative] : Heme/Lymph [Dry Eyes] : dryness of the eyes [Presently in menopause ___] : presently in menopause [unfilled]

## 2020-05-22 NOTE — ADDENDUM
[FreeTextEntry1] : Cystoscopy performed today - showed no bladder/urethral masses or lesions.  Bloody efflux noted from bilateral ureteral orifices\par \par Plan for OR for b/l ureteroscopy, selective cytologies

## 2020-05-22 NOTE — HISTORY OF PRESENT ILLNESS
[FreeTextEntry1] : Patient is a 64 yo F who presents for 4 days of constant painless gross hematuria.  No prior h/o gross hematuria.  No obvious inciting event/factor.  No trauma.  She had UA/cx/cyto done. Ucx and cyto negative.  UAshowed <720 RBCs, 10 wbcs, 100 protein.  She denies pain.  No flank pain, dysuria, urgency, frequency.  She has mild baseline incontinence.  No urethral/vaginal discharge.  She has wiped her vagina in absence of voiding and there has been no bloody vaginal discharge.\par CT urogram did not show obvious etiology for gross hematuria, mild right hydro, large fibroid uterus.  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  No smoking.  No relevant family history.

## 2020-05-22 NOTE — ASSESSMENT
[FreeTextEntry1] : Patient is a 66 yo F who presents with painless gross hematuria.\par \par Pt will f/u with GYN re fibroid uterus.\par Discussed with pt and  plan for cystoscopy to complete evaluation.\par

## 2020-05-25 LAB
ALBUMIN SERPL ELPH-MCNC: 4.7 G/DL
ALP BLD-CCNC: 50 U/L
ALT SERPL-CCNC: 25 U/L
ANION GAP SERPL CALC-SCNC: 14 MMOL/L
APTT BLD: 34.7 SEC
AST SERPL-CCNC: 19 U/L
BASOPHILS # BLD AUTO: 0.03 K/UL
BASOPHILS NFR BLD AUTO: 0.5 %
BILIRUB SERPL-MCNC: 0.3 MG/DL
BUN SERPL-MCNC: 18 MG/DL
C3 SERPL-MCNC: 70 MG/DL
C4 SERPL-MCNC: 29 MG/DL
CALCIUM SERPL-MCNC: 9.8 MG/DL
CHLORIDE SERPL-SCNC: 103 MMOL/L
CO2 SERPL-SCNC: 24 MMOL/L
CREAT SERPL-MCNC: 0.84 MG/DL
CRP SERPL-MCNC: <0.1 MG/DL
DEPRECATED KAPPA LC FREE/LAMBDA SER: 1.68 RATIO
EOSINOPHIL # BLD AUTO: 0.1 K/UL
EOSINOPHIL NFR BLD AUTO: 1.6 %
ERYTHROCYTE [SEDIMENTATION RATE] IN BLOOD BY WESTERGREN METHOD: 31 MM/HR
GLUCOSE SERPL-MCNC: 101 MG/DL
HCT VFR BLD CALC: 45.9 %
HGB BLD-MCNC: 14.1 G/DL
IGA SER QL IEP: 163 MG/DL
IMM GRANULOCYTES NFR BLD AUTO: 0.3 %
INR PPP: 0.99 RATIO
KAPPA LC CSF-MCNC: 0.8 MG/DL
KAPPA LC SERPL-MCNC: 1.34 MG/DL
LYMPHOCYTES # BLD AUTO: 1.63 K/UL
LYMPHOCYTES NFR BLD AUTO: 26.1 %
MAN DIFF?: NORMAL
MCHC RBC-ENTMCNC: 28.8 PG
MCHC RBC-ENTMCNC: 30.7 GM/DL
MCV RBC AUTO: 93.9 FL
MONOCYTES # BLD AUTO: 0.48 K/UL
MONOCYTES NFR BLD AUTO: 7.7 %
NEUTROPHILS # BLD AUTO: 3.99 K/UL
NEUTROPHILS NFR BLD AUTO: 63.8 %
PLATELET # BLD AUTO: 233 K/UL
POTASSIUM SERPL-SCNC: 5.3 MMOL/L
PROT SERPL-MCNC: 6.6 G/DL
PT BLD: 11.3 SEC
RBC # BLD: 4.89 M/UL
RBC # FLD: 13.5 %
SODIUM SERPL-SCNC: 141 MMOL/L
WBC # FLD AUTO: 6.25 K/UL

## 2020-05-26 ENCOUNTER — LABORATORY RESULT (OUTPATIENT)
Age: 65
End: 2020-05-26

## 2020-05-26 LAB
ALBUMIN MFR SERPL ELPH: 63.2 %
ALBUMIN SERPL-MCNC: 4.2 G/DL
ALBUMIN/GLOB SERPL: 1.8 RATIO
ALPHA1 GLOB MFR SERPL ELPH: 3.7 %
ALPHA1 GLOB SERPL ELPH-MCNC: 0.2 G/DL
ALPHA2 GLOB MFR SERPL ELPH: 11.1 %
ALPHA2 GLOB SERPL ELPH-MCNC: 0.7 G/DL
B-GLOBULIN MFR SERPL ELPH: 9.9 %
B-GLOBULIN SERPL ELPH-MCNC: 0.7 G/DL
DEPRECATED KAPPA LC FREE/LAMBDA SER: 1.68 RATIO
GAMMA GLOB FLD ELPH-MCNC: 0.8 G/DL
GAMMA GLOB MFR SERPL ELPH: 12.1 %
IGA SER QL IEP: 163 MG/DL
IGG SER QL IEP: 711 MG/DL
IGM SER QL IEP: 152 MG/DL
INTERPRETATION SERPL IEP-IMP: NORMAL
KAPPA LC CSF-MCNC: 0.8 MG/DL
KAPPA LC SERPL-MCNC: 1.34 MG/DL
M PROTEIN SPEC IFE-MCNC: NORMAL
PROT SERPL-MCNC: 6.6 G/DL
PROT SERPL-MCNC: 6.6 G/DL

## 2020-05-27 DIAGNOSIS — R31.9 HEMATURIA, UNSPECIFIED: ICD-10-CM

## 2020-05-27 LAB
APPEARANCE: CLEAR
BACTERIA: NEGATIVE
BILIRUBIN URINE: NEGATIVE
BLOOD URINE: ABNORMAL
COLOR: NORMAL
GLUCOSE QUALITATIVE U: NEGATIVE
HYALINE CASTS: 2 /LPF
KETONES URINE: NEGATIVE
LEUKOCYTE ESTERASE URINE: NEGATIVE
MICROSCOPIC-UA: NORMAL
NITRITE URINE: NEGATIVE
PH URINE: 7
PROTEIN URINE: NEGATIVE
RED BLOOD CELLS URINE: 40 /HPF
SPECIFIC GRAVITY URINE: 1.01
SQUAMOUS EPITHELIAL CELLS: 1 /HPF
UROBILINOGEN URINE: NORMAL
WHITE BLOOD CELLS URINE: 1 /HPF

## 2020-05-28 LAB — ANA SER IF-ACNC: NEGATIVE

## 2020-05-29 ENCOUNTER — APPOINTMENT (OUTPATIENT)
Dept: NEPHROLOGY | Facility: CLINIC | Age: 65
End: 2020-05-29
Payer: COMMERCIAL

## 2020-05-29 PROCEDURE — 99205 OFFICE O/P NEW HI 60 MIN: CPT | Mod: 95

## 2020-05-30 ENCOUNTER — NON-APPOINTMENT (OUTPATIENT)
Age: 65
End: 2020-05-30

## 2020-05-30 ENCOUNTER — LABORATORY RESULT (OUTPATIENT)
Age: 65
End: 2020-05-30

## 2020-06-02 ENCOUNTER — NON-APPOINTMENT (OUTPATIENT)
Age: 65
End: 2020-06-02

## 2020-06-02 NOTE — CONSULT LETTER
[Courtesy Letter:] : I had the pleasure of seeing your patient, [unfilled], in my office today. [Dear  ___] : Dear  [unfilled], [Consult Closing:] : Thank you very much for allowing me to participate in the care of this patient.  If you have any questions, please do not hesitate to contact me. [Please see my note below.] : Please see my note below. [FreeTextEntry3] : Aline Mcdonald MD\par  [facility]\par Division of Nephrology and Hypertension [DrRosie  ___] : Dr. ARELLANO [Sincerely,] : Sincerely,

## 2020-06-02 NOTE — HISTORY OF PRESENT ILLNESS
[Other Location: e.g. Home (Enter Location, City,State)___] : at [unfilled] [Home] : at home, [unfilled] , at the time of the visit. 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 Urinalysis was positive for blood and protein. \par Her urine culture was negative. Urine cytology was negative. CT scan was negative. \par She had seen Dr Cottrell urology who did a cystoscopy and found blood at both ureteral orifices bilaterally. Bladder was normal.\par Her hematuria subsided over the weekend. \par Denies NSAID use. No vigorous exercise. No fever, no viral or other symptomatology prior. \par She takes crestor since Dec and synthroid. No other meds. \par No changes in wt or BP. Her BP has been in the 110/70\par She has dry eye on left. Ocular migraine rarely. \par Has vaginal atrophy and takes estrogen creme.\par She has known extremely large fibroid and in the past had taken lupron shots with annual sono but now is ready to get that evaluated for hysterectomy. She denies vag bleeding.\par Family history significant for HTN in parents and CAD CABG with CKD/ ESRD in her father. He was reportedly diagnosed with post infectious GN for his CKD and contrast nephropathy caused ESRD.

## 2020-06-02 NOTE — ASSESSMENT
[FreeTextEntry1] : 64 yo female with Gross hematuria unclear etiology. \par Appears to have resolved. Proteinuria improved. Likely bc of gross hematuria. \par Cytology and cystoscopy was negative. \par I agree with pursuing ureteroscopy in the near future for better proximal visualization. \par She does have mild hydro on CT scan from extrinsic compression from uterine mass. She will see her GYN.\par Papillary necrosis is uncommon, but possible per my discussion with urology in setting of pressure from obstruction.\par Will send ANCA screen for vasculitis. \par C3 level is low with normal C4\par Will repeat levels. Will consider C3 GN if persistently low however no other signs, proteinuria improved and BP normal, renal function normal. \par I think it is fair to repeat serology and include hep panel and repeat ESR and observe for now. If her u/a still has microscopic hematuria I would like to assess for dysmorphia and GN process. \par We agreed to hold off any renal biopsy for now as long as all renal parameters remain stable. \par All questions were answered and will follow. \par \par 6/2: Addendum: Repeat u/a with proteinuria. Will request a urine for microscopic eval with me. \par C3 still low. Check 24 hour urine for protein, immunoglobulin and light chain. \par Discussed with Dr Cottrell who will schedule ureteroscopy. If negative and proteinuria persists then renal biopsy.\par Discussed with patient who is agreeable to plan.

## 2020-06-02 NOTE — REVIEW OF SYSTEMS
[Dry Eyes] : dryness of the eyes [Negative] : Endocrine [FreeTextEntry5] : rare edema at ankles; had CT coronaries on crestor; carotid normal [FreeTextEntry8] : gross hematuria

## 2020-06-03 LAB
ALBUMIN SERPL ELPH-MCNC: 4.4 G/DL
ANA SER IF-ACNC: NEGATIVE
ANION GAP SERPL CALC-SCNC: 15 MMOL/L
APPEARANCE: ABNORMAL
BACTERIA: ABNORMAL
BILIRUBIN URINE: NEGATIVE
BLOOD URINE: ABNORMAL
BUN SERPL-MCNC: 16 MG/DL
C3 SERPL-MCNC: 67 MG/DL
C4 SERPL-MCNC: 26 MG/DL
CALCIUM SERPL-MCNC: 9.4 MG/DL
CHLORIDE SERPL-SCNC: 104 MMOL/L
CO2 SERPL-SCNC: 23 MMOL/L
COLOR: NORMAL
CREAT SERPL-MCNC: 0.75 MG/DL
CREAT SPEC-SCNC: 109 MG/DL
DSDNA AB SER-ACNC: <12 IU/ML
ENA SS-A AB SER IA-ACNC: 0.2 AL
ENA SS-B AB SER IA-ACNC: <0.2 AL
ERYTHROCYTE [SEDIMENTATION RATE] IN BLOOD BY WESTERGREN METHOD: 16 MM/HR
GBM AB TITR SER IF: <1 AL
GLUCOSE QUALITATIVE U: NEGATIVE
GLUCOSE SERPL-MCNC: 84 MG/DL
HBV CORE IGG+IGM SER QL: NONREACTIVE
HBV SURFACE AB SER QL: REACTIVE
HBV SURFACE AG SER QL: NONREACTIVE
HCV AB SER QL: NONREACTIVE
HCV S/CO RATIO: 0.33 S/CO
HYALINE CASTS: 3 /LPF
KETONES URINE: NEGATIVE
LEUKOCYTE ESTERASE URINE: NEGATIVE
MICROALBUMIN 24H UR DL<=1MG/L-MCNC: 312.2 MG/DL
MICROALBUMIN/CREAT 24H UR-RTO: 2869 MG/G
MICROSCOPIC-UA: NORMAL
MPO AB + PR3 PNL SER: NORMAL
NITRITE URINE: NEGATIVE
PH URINE: 6.5
PHOSPHATE SERPL-MCNC: 4.2 MG/DL
POTASSIUM SERPL-SCNC: 4.9 MMOL/L
PROTEIN URINE: ABNORMAL
RED BLOOD CELLS URINE: 11 /HPF
SODIUM SERPL-SCNC: 142 MMOL/L
SPECIFIC GRAVITY URINE: 1.02
SQUAMOUS EPITHELIAL CELLS: 10 /HPF
UROBILINOGEN URINE: NORMAL
WHITE BLOOD CELLS URINE: 3 /HPF

## 2020-06-04 LAB
APPEARANCE: CLEAR
BACTERIA: NEGATIVE
BILIRUBIN URINE: NEGATIVE
BLOOD URINE: NEGATIVE
COLOR: NORMAL
CREAT SPEC-SCNC: 59 MG/DL
CREAT SPEC-SCNC: 59 MG/DL
CREAT/PROT UR: 0.1 RATIO
GLUCOSE QUALITATIVE U: NEGATIVE
HYALINE CASTS: 0 /LPF
KETONES URINE: NEGATIVE
LEUKOCYTE ESTERASE URINE: NEGATIVE
MICROALBUMIN 24H UR DL<=1MG/L-MCNC: <1.2 MG/DL
MICROALBUMIN/CREAT 24H UR-RTO: NORMAL MG/G
MICROSCOPIC-UA: NORMAL
NITRITE URINE: NEGATIVE
PH URINE: 6.5
PROT UR-MCNC: 7 MG/DL
PROTEIN URINE: NEGATIVE
RED BLOOD CELLS URINE: 1 /HPF
SPECIFIC GRAVITY URINE: 1.02
SQUAMOUS EPITHELIAL CELLS: 0 /HPF
UROBILINOGEN URINE: NORMAL
WHITE BLOOD CELLS URINE: 0 /HPF

## 2020-06-06 ENCOUNTER — NON-APPOINTMENT (OUTPATIENT)
Age: 65
End: 2020-06-06

## 2020-06-07 ENCOUNTER — NON-APPOINTMENT (OUTPATIENT)
Age: 65
End: 2020-06-07

## 2020-06-09 LAB
ALBUPE: 65.7 %
ALPHA1UPE: 6.6 %
ALPHA2UPE: 6.7 %
BENCE JONES EXCRETION: 0 MG/24HR
BETAUPE: 9.4 %
CREAT 24H UR-MCNC: 0.7 G/24 H
CREAT 24H UR-MCNC: 0.7 G/24 H
CREAT ?TM UR-MCNC: 52 MG/DL
CREATININE UR (MAYO): 52 MG/DL
GAMMAUPE: 11.6 %
IGA 24H UR QL IFE: NORMAL
KAPPA LC 24H UR QL: 0 MG/DL
KAPPA LC 24H UR-MCNC: 1.91 MG/DL
KAPPA LC 24H UR-MRATE: 25.79 MG/24 H
KAPPA TOTAL LIGHT CHAIN, URINE: 1.93 MG/DL
KAPPA/LAMBDA TOTAL LIGHT CHAIN RATIO, URINE: 2.47
LAMBDA LC 24H UR-MCNC: 0.75 MG/DL
LAMBDA LC 24H UR-MRATE: 10.13 MG/24 H
LAMBDA TOTAL LIGHT CHAIN, URINE: 0.78 MG/DL
M PROTEIN 24H MFR UR ELPH: 0 %
PROT 24H UR-MRATE: 79 MG/DL
PROT ?TM UR-MCNC: 24 HR
PROT ?TM UR-MCNC: 24 HR
PROT PATTERN 24H UR ELPH-IMP: NORMAL
PROT UR-MCNC: 1066 MG/24 H
PROT UR-MCNC: 80 MG/DL
PROT UR-MCNC: 80 MG/DL
SPECIMEN VOL 24H UR: 1350 ML
SPECIMEN VOL 24H UR: 1350 ML
SPECIMEN VOL 24H UR: 1350 ML/24 H
U PROTEIN QNT CALCULATION: 1080 MG/24 H

## 2020-06-10 DIAGNOSIS — J01.90 ACUTE SINUSITIS, UNSPECIFIED: ICD-10-CM

## 2020-06-13 LAB — SARS-COV-2 N GENE NPH QL NAA+PROBE: NOT DETECTED

## 2020-06-19 ENCOUNTER — NON-APPOINTMENT (OUTPATIENT)
Age: 65
End: 2020-06-19

## 2020-06-19 ENCOUNTER — APPOINTMENT (OUTPATIENT)
Dept: INTERNAL MEDICINE | Facility: CLINIC | Age: 65
End: 2020-06-19
Payer: COMMERCIAL

## 2020-06-19 VITALS
DIASTOLIC BLOOD PRESSURE: 66 MMHG | OXYGEN SATURATION: 97 % | SYSTOLIC BLOOD PRESSURE: 120 MMHG | BODY MASS INDEX: 19.92 KG/M2 | HEART RATE: 82 BPM | WEIGHT: 102 LBS

## 2020-06-19 PROCEDURE — 93000 ELECTROCARDIOGRAM COMPLETE: CPT

## 2020-06-19 PROCEDURE — 99214 OFFICE O/P EST MOD 30 MIN: CPT | Mod: 25

## 2020-06-19 PROCEDURE — 81003 URINALYSIS AUTO W/O SCOPE: CPT | Mod: QW

## 2020-06-19 RX ORDER — LEVOTHYROXINE SODIUM 0.17 MG/1
TABLET ORAL
Refills: 0 | Status: DISCONTINUED | COMMUNITY
End: 2020-06-19

## 2020-06-19 RX ORDER — ROSUVASTATIN CALCIUM 10 MG/1
10 TABLET, FILM COATED ORAL
Refills: 0 | Status: DISCONTINUED | COMMUNITY
End: 2020-06-19

## 2020-06-19 RX ORDER — AZITHROMYCIN 250 MG/1
250 TABLET, FILM COATED ORAL
Qty: 1 | Refills: 0 | Status: DISCONTINUED | COMMUNITY
Start: 2020-06-10 | End: 2020-06-19

## 2020-06-19 NOTE — PHYSICAL EXAM
[No Acute Distress] : no acute distress [Well-Appearing] : well-appearing [Supple] : supple [Normal Sclera/Conjunctiva] : normal sclera/conjunctiva [No Respiratory Distress] : no respiratory distress  [Clear to Auscultation] : lungs were clear to auscultation bilaterally [Normal Rate] : normal rate  [Regular Rhythm] : with a regular rhythm [No Murmur] : no murmur heard [Normal S1, S2] : normal S1 and S2 [Soft] : abdomen soft [Non Tender] : non-tender

## 2020-06-20 ENCOUNTER — NON-APPOINTMENT (OUTPATIENT)
Age: 65
End: 2020-06-20

## 2020-06-22 LAB
ANION GAP SERPL CALC-SCNC: 15 MMOL/L
APPEARANCE: CLEAR
BACTERIA UR CULT: NORMAL
BACTERIA: NEGATIVE
BASOPHILS # BLD AUTO: 0.04 K/UL
BASOPHILS NFR BLD AUTO: 0.6 %
BILIRUBIN URINE: NEGATIVE
BLOOD URINE: NEGATIVE
BUN SERPL-MCNC: 12 MG/DL
CALCIUM SERPL-MCNC: 9.9 MG/DL
CHLORIDE SERPL-SCNC: 104 MMOL/L
CO2 SERPL-SCNC: 24 MMOL/L
COLOR: NORMAL
CREAT SERPL-MCNC: 0.67 MG/DL
EOSINOPHIL # BLD AUTO: 0.07 K/UL
EOSINOPHIL NFR BLD AUTO: 1.1 %
GLUCOSE QUALITATIVE U: NEGATIVE
GLUCOSE SERPL-MCNC: 83 MG/DL
HCT VFR BLD CALC: 41.4 %
HGB BLD-MCNC: 13.4 G/DL
HYALINE CASTS: 0 /LPF
IMM GRANULOCYTES NFR BLD AUTO: 0.3 %
KETONES URINE: NEGATIVE
LEUKOCYTE ESTERASE URINE: NEGATIVE
LYMPHOCYTES # BLD AUTO: 1.49 K/UL
LYMPHOCYTES NFR BLD AUTO: 22.4 %
MAN DIFF?: NORMAL
MCHC RBC-ENTMCNC: 29.3 PG
MCHC RBC-ENTMCNC: 32.4 GM/DL
MCV RBC AUTO: 90.4 FL
MICROSCOPIC-UA: NORMAL
MONOCYTES # BLD AUTO: 0.42 K/UL
MONOCYTES NFR BLD AUTO: 6.3 %
NEUTROPHILS # BLD AUTO: 4.62 K/UL
NEUTROPHILS NFR BLD AUTO: 69.3 %
NITRITE URINE: NEGATIVE
PH URINE: 6
PLATELET # BLD AUTO: 260 K/UL
POTASSIUM SERPL-SCNC: 4.4 MMOL/L
PROTEIN URINE: NEGATIVE
RBC # BLD: 4.58 M/UL
RBC # FLD: 13.2 %
RED BLOOD CELLS URINE: 1 /HPF
SODIUM SERPL-SCNC: 144 MMOL/L
SPECIFIC GRAVITY URINE: 1.02
SQUAMOUS EPITHELIAL CELLS: 1 /HPF
UROBILINOGEN URINE: NORMAL
WBC # FLD AUTO: 6.66 K/UL
WHITE BLOOD CELLS URINE: 0 /HPF

## 2020-06-22 NOTE — ASSESSMENT
[Patient Optimized for Surgery] : Patient optimized for surgery [FreeTextEntry4] : 64 y/o female here for preop assessment prior to ureteroscopy on 7/11 for workup of transient hematuria.  \par \par Patient is acceptable risk for the planned procedure.  Patient has no active cardiac conditions and acceptable METs (>4).  Patent coronary arteries on CT angio.   EKG EKG was reviewed today 6/19 and without ischemic changes.  \par Labs drawn 6/19 - normal CBC, BMP, UA and negative urine culture.  \par COVID swab done 6/12 negative, repeat as needed as per surgical team.\par  \par May proceed with planned procedure.\par

## 2020-06-22 NOTE — HISTORY OF PRESENT ILLNESS
[No Pertinent Cardiac History] : no history of aortic stenosis, atrial fibrillation, coronary artery disease, recent myocardial infarction, or implantable device/pacemaker [No Pertinent Pulmonary History] : no history of asthma, COPD, sleep apnea, or smoking [Excellent (>10 METs)] : Excellent (>10 METs) [No Adverse Anesthesia Reaction] : no adverse anesthesia reaction in self or family member [(Patient denies any chest pain, claudication, dyspnea on exertion, orthopnea, palpitations or syncope)] : Patient denies any chest pain, claudication, dyspnea on exertion, orthopnea, palpitations or syncope [Chronic Anticoagulation] : no chronic anticoagulation [Chronic Kidney Disease] : no chronic kidney disease [Diabetes] : no diabetes [FreeTextEntry2] : 7/1 [FreeTextEntry1] : ureteroscopy [FreeTextEntry4] : Preop eval prior to ureteroscopy for transient gross hematuria.  \par \par \par \par did have recent sinus headached on the left side, resolved after treatment with a zpak.  no fevers.  feeling fine otherwise.  no fevers, no chills, no abd pain\par \par Saw GYN yesterday, likely hysterectomy in the fall for fibroid.  \par \par No further blood in urine.   [FreeTextEntry3] : [place] [FreeTextEntry7] : Carotid Doppler 11/19: no stenosis\par CT angio 12/19: patent coronaries

## 2020-06-22 NOTE — RESULTS/DATA
[] : results reviewed [de-identified] : stable [de-identified] : stable [de-identified] : SR, no ischemic changes, RSR' in V1/2 [de-identified] : n/a

## 2020-06-24 ENCOUNTER — NON-APPOINTMENT (OUTPATIENT)
Age: 65
End: 2020-06-24

## 2020-06-29 RX ORDER — LIDOCAINE HCL 20 MG/ML
0.2 VIAL (ML) INJECTION ONCE
Refills: 0 | Status: DISCONTINUED | OUTPATIENT
Start: 2020-07-01 | End: 2020-07-16

## 2020-06-29 RX ORDER — CIPROFLOXACIN LACTATE 400MG/40ML
400 VIAL (ML) INTRAVENOUS ONCE
Refills: 0 | Status: DISCONTINUED | OUTPATIENT
Start: 2020-07-01 | End: 2020-07-16

## 2020-06-29 RX ORDER — SODIUM CHLORIDE 9 MG/ML
3 INJECTION INTRAMUSCULAR; INTRAVENOUS; SUBCUTANEOUS EVERY 8 HOURS
Refills: 0 | Status: DISCONTINUED | OUTPATIENT
Start: 2020-07-01 | End: 2020-07-16

## 2020-06-30 ENCOUNTER — TRANSCRIPTION ENCOUNTER (OUTPATIENT)
Age: 65
End: 2020-06-30

## 2020-06-30 LAB — SARS-COV-2 N GENE NPH QL NAA+PROBE: NOT DETECTED

## 2020-07-01 ENCOUNTER — APPOINTMENT (OUTPATIENT)
Dept: UROLOGY | Facility: HOSPITAL | Age: 65
End: 2020-07-01

## 2020-07-01 ENCOUNTER — RESULT REVIEW (OUTPATIENT)
Age: 65
End: 2020-07-01

## 2020-07-01 ENCOUNTER — OUTPATIENT (OUTPATIENT)
Dept: OUTPATIENT SERVICES | Facility: HOSPITAL | Age: 65
LOS: 1 days | End: 2020-07-01
Payer: COMMERCIAL

## 2020-07-01 VITALS
RESPIRATION RATE: 16 BRPM | HEART RATE: 55 BPM | TEMPERATURE: 98 F | DIASTOLIC BLOOD PRESSURE: 56 MMHG | OXYGEN SATURATION: 98 % | WEIGHT: 104.06 LBS | SYSTOLIC BLOOD PRESSURE: 90 MMHG

## 2020-07-01 VITALS
SYSTOLIC BLOOD PRESSURE: 119 MMHG | HEART RATE: 64 BPM | OXYGEN SATURATION: 100 % | DIASTOLIC BLOOD PRESSURE: 68 MMHG | RESPIRATION RATE: 16 BRPM

## 2020-07-01 DIAGNOSIS — Z98.890 OTHER SPECIFIED POSTPROCEDURAL STATES: Chronic | ICD-10-CM

## 2020-07-01 DIAGNOSIS — R31.0 GROSS HEMATURIA: ICD-10-CM

## 2020-07-01 PROCEDURE — C1769: CPT

## 2020-07-01 PROCEDURE — 52354 CYSTOURETERO W/BIOPSY: CPT | Mod: RT

## 2020-07-01 PROCEDURE — C1758: CPT

## 2020-07-01 PROCEDURE — 76000 FLUOROSCOPY <1 HR PHYS/QHP: CPT

## 2020-07-01 PROCEDURE — 88112 CYTOPATH CELL ENHANCE TECH: CPT | Mod: 26

## 2020-07-01 PROCEDURE — 88112 CYTOPATH CELL ENHANCE TECH: CPT

## 2020-07-01 PROCEDURE — 88304 TISSUE EXAM BY PATHOLOGIST: CPT | Mod: 26

## 2020-07-01 PROCEDURE — 88304 TISSUE EXAM BY PATHOLOGIST: CPT

## 2020-07-01 RX ORDER — ACETAMINOPHEN 500 MG
750 TABLET ORAL ONCE
Refills: 0 | Status: DISCONTINUED | OUTPATIENT
Start: 2020-07-01 | End: 2020-07-16

## 2020-07-01 RX ORDER — FENTANYL CITRATE 50 UG/ML
50 INJECTION INTRAVENOUS
Refills: 0 | Status: DISCONTINUED | OUTPATIENT
Start: 2020-07-01 | End: 2020-07-01

## 2020-07-01 RX ORDER — ACETAMINOPHEN 500 MG
1000 TABLET ORAL ONCE
Refills: 0 | Status: DISCONTINUED | OUTPATIENT
Start: 2020-07-01 | End: 2020-07-01

## 2020-07-01 RX ORDER — SODIUM CHLORIDE 9 MG/ML
1000 INJECTION, SOLUTION INTRAVENOUS
Refills: 0 | Status: DISCONTINUED | OUTPATIENT
Start: 2020-07-01 | End: 2020-07-16

## 2020-07-01 RX ORDER — ONDANSETRON 8 MG/1
4 TABLET, FILM COATED ORAL ONCE
Refills: 0 | Status: DISCONTINUED | OUTPATIENT
Start: 2020-07-01 | End: 2020-07-16

## 2020-07-01 NOTE — PRE-ANESTHESIA EVALUATION ADULT - NSANTHTOTALSCORECAL_ENT_A_CORE
Subjective   Patient ID: Karmen is a 69 year old female.    Chief Complaint   Patient presents with   • Medication Refill     pt is here today for refills      1) DM  2) Hypercholesterolemia      Diabetes   She presents for her follow-up diabetic visit. She has type 2 diabetes mellitus. Pertinent negatives for hypoglycemia include no confusion, nervousness/anxiousness or pallor. Pertinent negatives for diabetes include no chest pain and no fatigue.       Patient's medications, allergies, past medical, surgical, social and family histories were reviewed and updated as appropriate.    Review of Systems   Constitutional: Negative for chills, fatigue, fever and unexpected weight change.   HENT: Negative for congestion, ear discharge, ear pain, nosebleeds, rhinorrhea, sinus pressure, sneezing and sore throat.    Eyes: Negative.    Respiratory: Negative for apnea, cough, choking, chest tightness, shortness of breath and wheezing.    Cardiovascular: Negative for chest pain, palpitations and leg swelling.   Gastrointestinal: Negative for abdominal distention, abdominal pain, anal bleeding, blood in stool, constipation, diarrhea and nausea.   Endocrine: Negative.    Genitourinary: Negative for decreased urine volume, difficulty urinating, dysuria, flank pain, frequency, hematuria and urgency.   Musculoskeletal: Negative for arthralgias, back pain, gait problem, joint swelling, myalgias and neck pain.   Skin: Negative for pallor and rash.   Allergic/Immunologic: Negative for immunocompromised state.   Neurological: Negative.    Hematological: Negative.    Psychiatric/Behavioral: Negative for confusion, dysphoric mood, hallucinations, self-injury, sleep disturbance and suicidal ideas. The patient is not nervous/anxious.        Objective   Physical Exam   Constitutional: She is oriented to person, place, and time. No distress.   HENT:   Right Ear: External ear normal.   Left Ear: External ear normal.   Nose: Nose normal.    Mouth/Throat: Oropharynx is clear and moist.   Eyes: Conjunctivae are normal.   Neck: No thyromegaly present.   Cardiovascular: Normal rate, regular rhythm and normal heart sounds. Exam reveals no friction rub.   No murmur heard.  Pulmonary/Chest: Effort normal and breath sounds normal. No stridor. No respiratory distress. She has no wheezes. She has no rales.   Abdominal: Soft. Bowel sounds are normal. She exhibits no distension. There is no tenderness. There is no rebound and no guarding. No hernia.   Musculoskeletal: Normal range of motion. She exhibits no edema or tenderness.   Lymphadenopathy:     She has no cervical adenopathy.   Neurological: She is alert and oriented to person, place, and time. No cranial nerve deficit or sensory deficit. She exhibits normal muscle tone. Coordination normal.   Skin: No rash noted. She is not diaphoretic. No erythema. No pallor.   Psychiatric: She has a normal mood and affect. Her behavior is normal. Judgment and thought content normal.       Assessment   Problem List Items Addressed This Visit        Endocrine    Controlled type 2 diabetes mellitus, without long-term current use of insulin (CMS/[facility]) - Primary    Relevant Medications    ezetimibe (ZETIA) 10 MG tablet    glipiZIDE (GLUCOTROL) 5 MG 24 hr tablet    pioglitazone (ACTOS) 45 MG tablet      Other Visit Diagnoses     Pure hypercholesterolemia        Relevant Medications    ezetimibe (ZETIA) 10 MG tablet    Need for prophylactic vaccination against Streptococcus pneumoniae (pneumococcus) and influenza        Relevant Orders    PNEUMOCOCCAL POLYSACCHARIDE ADULT VACC, IM/SQ (Completed)        1) Refilled meds.  Follow-up labs in the next few weeks, patient not fasting today.  Follow-up with Dr. Berg after labs are completed.  Patient seems to be noncompliant.  2) Pneumovax given today.  Did receive flu vaccine 2018 season.  Received Prevnar 13 last year.  Schedule follow up: in 2 months   1

## 2020-07-01 NOTE — PRE-ANESTHESIA EVALUATION ADULT - NSANTHOSAYNRD_GEN_A_CORE
No. NISREEN screening performed.  STOP BANG Legend: 0-2 = LOW Risk; 3-4 = INTERMEDIATE Risk; 5-8 = HIGH Risk

## 2020-07-06 ENCOUNTER — NON-APPOINTMENT (OUTPATIENT)
Age: 65
End: 2020-07-06

## 2020-07-06 LAB
NON-GYNECOLOGICAL CYTOLOGY STUDY: SIGNIFICANT CHANGE UP
NON-GYNECOLOGICAL CYTOLOGY STUDY: SIGNIFICANT CHANGE UP

## 2020-07-07 PROBLEM — Z00.00 ENCOUNTER FOR PREVENTIVE HEALTH EXAMINATION: Status: ACTIVE | Noted: 2020-07-07

## 2020-07-09 ENCOUNTER — APPOINTMENT (OUTPATIENT)
Age: 65
End: 2020-07-09

## 2020-07-09 LAB — SURGICAL PATHOLOGY STUDY: SIGNIFICANT CHANGE UP

## 2020-08-18 PROBLEM — E78.5 HYPERLIPIDEMIA, UNSPECIFIED: Chronic | Status: ACTIVE | Noted: 2020-06-29

## 2020-08-18 PROBLEM — M85.80 OTHER SPECIFIED DISORDERS OF BONE DENSITY AND STRUCTURE, UNSPECIFIED SITE: Chronic | Status: ACTIVE | Noted: 2020-06-29

## 2020-09-04 ENCOUNTER — APPOINTMENT (OUTPATIENT)
Dept: INTERNAL MEDICINE | Facility: CLINIC | Age: 65
End: 2020-09-04
Payer: COMMERCIAL

## 2020-09-04 VITALS
DIASTOLIC BLOOD PRESSURE: 58 MMHG | HEIGHT: 60 IN | SYSTOLIC BLOOD PRESSURE: 118 MMHG | WEIGHT: 102 LBS | BODY MASS INDEX: 20.03 KG/M2 | HEART RATE: 76 BPM

## 2020-09-04 DIAGNOSIS — Z87.448 PERSONAL HISTORY OF OTHER DISEASES OF URINARY SYSTEM: ICD-10-CM

## 2020-09-04 DIAGNOSIS — N39.0 URINARY TRACT INFECTION, SITE NOT SPECIFIED: ICD-10-CM

## 2020-09-04 DIAGNOSIS — Z71.84 ENC FOR HEALTH COUNSELING RELATED TO TRAVEL: ICD-10-CM

## 2020-09-04 DIAGNOSIS — Z11.1 ENCOUNTER FOR SCREENING FOR RESPIRATORY TUBERCULOSIS: ICD-10-CM

## 2020-09-04 DIAGNOSIS — A49.9 URINARY TRACT INFECTION, SITE NOT SPECIFIED: ICD-10-CM

## 2020-09-04 DIAGNOSIS — Z20.9 CONTACT WITH AND (SUSPECTED) EXPOSURE TO UNSPECIFIED COMMUNICABLE DISEASE: ICD-10-CM

## 2020-09-04 PROCEDURE — G0009: CPT

## 2020-09-04 PROCEDURE — 90472 IMMUNIZATION ADMIN EACH ADD: CPT

## 2020-09-04 PROCEDURE — 36415 COLL VENOUS BLD VENIPUNCTURE: CPT

## 2020-09-04 PROCEDURE — 90732 PPSV23 VACC 2 YRS+ SUBQ/IM: CPT

## 2020-09-04 PROCEDURE — 93000 ELECTROCARDIOGRAM COMPLETE: CPT

## 2020-09-04 PROCEDURE — 99214 OFFICE O/P EST MOD 30 MIN: CPT | Mod: 25

## 2020-09-04 PROCEDURE — 90662 IIV NO PRSV INCREASED AG IM: CPT

## 2020-09-04 NOTE — PHYSICAL EXAM
[No Acute Distress] : no acute distress [Well-Appearing] : well-appearing [No Respiratory Distress] : no respiratory distress  [Normal Sclera/Conjunctiva] : normal sclera/conjunctiva [Clear to Auscultation] : lungs were clear to auscultation bilaterally [No Accessory Muscle Use] : no accessory muscle use [Normal Rate] : normal rate  [Regular Rhythm] : with a regular rhythm [Normal S1, S2] : normal S1 and S2 [No Murmur] : no murmur heard [Soft] : abdomen soft [No Edema] : there was no peripheral edema [Non Tender] : non-tender

## 2020-09-08 LAB
ALBUMIN SERPL ELPH-MCNC: 5.2 G/DL
ALP BLD-CCNC: 58 U/L
ALT SERPL-CCNC: 23 U/L
ANION GAP SERPL CALC-SCNC: 11 MMOL/L
APPEARANCE: CLEAR
APTT BLD: 32.6 SEC
AST SERPL-CCNC: 25 U/L
BACTERIA: NEGATIVE
BASOPHILS # BLD AUTO: 0.03 K/UL
BASOPHILS NFR BLD AUTO: 0.5 %
BILIRUB SERPL-MCNC: 0.3 MG/DL
BILIRUBIN URINE: NEGATIVE
BLOOD URINE: NEGATIVE
BUN SERPL-MCNC: 15 MG/DL
CALCIUM SERPL-MCNC: 10.4 MG/DL
CHLORIDE SERPL-SCNC: 102 MMOL/L
CHOLEST SERPL-MCNC: 159 MG/DL
CHOLEST/HDLC SERPL: 1.9 RATIO
CO2 SERPL-SCNC: 28 MMOL/L
COLOR: NORMAL
CREAT SERPL-MCNC: 0.68 MG/DL
CREAT SPEC-SCNC: 58 MG/DL
EOSINOPHIL # BLD AUTO: 0.04 K/UL
EOSINOPHIL NFR BLD AUTO: 0.6 %
GLUCOSE QUALITATIVE U: NEGATIVE
GLUCOSE SERPL-MCNC: 85 MG/DL
HCT VFR BLD CALC: 43.5 %
HDLC SERPL-MCNC: 82 MG/DL
HGB BLD-MCNC: 13.6 G/DL
HYALINE CASTS: 0 /LPF
IMM GRANULOCYTES NFR BLD AUTO: 0.3 %
INR PPP: 1 RATIO
KETONES URINE: NEGATIVE
LDLC SERPL CALC-MCNC: 70 MG/DL
LEUKOCYTE ESTERASE URINE: NEGATIVE
LYMPHOCYTES # BLD AUTO: 1.58 K/UL
LYMPHOCYTES NFR BLD AUTO: 24.1 %
MAN DIFF?: NORMAL
MCHC RBC-ENTMCNC: 29.4 PG
MCHC RBC-ENTMCNC: 31.3 GM/DL
MCV RBC AUTO: 94 FL
MICROALBUMIN 24H UR DL<=1MG/L-MCNC: <1.2 MG/DL
MICROALBUMIN/CREAT 24H UR-RTO: NORMAL MG/G
MICROSCOPIC-UA: NORMAL
MONOCYTES # BLD AUTO: 0.38 K/UL
MONOCYTES NFR BLD AUTO: 5.8 %
NEUTROPHILS # BLD AUTO: 4.51 K/UL
NEUTROPHILS NFR BLD AUTO: 68.7 %
NITRITE URINE: NEGATIVE
PH URINE: 6.5
PLATELET # BLD AUTO: 221 K/UL
POTASSIUM SERPL-SCNC: 4.4 MMOL/L
PROT SERPL-MCNC: 7.2 G/DL
PROTEIN URINE: NEGATIVE
PT BLD: 11.8 SEC
RBC # BLD: 4.63 M/UL
RBC # FLD: 13.7 %
RED BLOOD CELLS URINE: 0 /HPF
SODIUM SERPL-SCNC: 142 MMOL/L
SPECIFIC GRAVITY URINE: 1.01
SQUAMOUS EPITHELIAL CELLS: 0 /HPF
TRIGL SERPL-MCNC: 36 MG/DL
TSH SERPL-ACNC: 1.91 UIU/ML
UROBILINOGEN URINE: NORMAL
WBC # FLD AUTO: 6.56 K/UL
WHITE BLOOD CELLS URINE: 1 /HPF

## 2020-09-08 NOTE — ASSESSMENT
[Ischemic Heart Disease] : Ischemic Heart Disease - No (0) [High Risk Surgery - Intraperitoneal, Intrathoracic or Supringuinal Vascular Procedures] : High Risk Surgery - Intraperitoneal, Intrathoracic or Supringuinal Vascular Procedures - Yes (1) [Prior Cerebrovascular Accident or TIA] : Prior Cerebrovascular Accident or TIA - No (0) [Congestive Heart Failure] : Congestive Heart Failure - No (0) [Insulin-dependent Diabetic (1 Point)] : Insulin-dependent Diabetic - No (0) [Creatinine >= 2mg/dL (1 Point)] : Creatinine >= 2mg/dL - No (0) [1] : 1 , RCRI Class: II, Risk of Post-Op Cardiac Complications: 6.0%, 95% CI for Risk Estimate: 4.9% - 7.4% [Patient Optimized for Surgery] : Patient optimized for surgery [FreeTextEntry4] : 64 y/o female with hx of hypothyroidism, controlled on replacement, recent episode of gross hematuria (negative workup), here for preoperative evaluation prior to abdominal hysterectomy for enlarged uterus.  \par \par Patient is acceptable risk for the planned procedure.  \par Patient has no active cardiac conditions and acceptable METs (>4).  \par EKG done today and is without ischemic changes.\par Labs were drawn 9/4 and are normal (see above). \par May proceed with planned surgery without further workup. \par \par \par HCM: Administered flu shot and pneumovax 23.   [No Further Testing Recommended] : no further testing recommended

## 2020-09-08 NOTE — HISTORY OF PRESENT ILLNESS
[No Pertinent Cardiac History] : no history of aortic stenosis, atrial fibrillation, coronary artery disease, recent myocardial infarction, or implantable device/pacemaker [No Pertinent Pulmonary History] : no history of asthma, COPD, sleep apnea, or smoking [No Adverse Anesthesia Reaction] : no adverse anesthesia reaction in self or family member [Excellent (>10 METs)] : Excellent (>10 METs) [(Patient denies any chest pain, claudication, dyspnea on exertion, orthopnea, palpitations or syncope)] : Patient denies any chest pain, claudication, dyspnea on exertion, orthopnea, palpitations or syncope [Chronic Anticoagulation] : no chronic anticoagulation [Chronic Kidney Disease] : no chronic kidney disease [FreeTextEntry1] : Abdominal Hysterectomy [Diabetes] : no diabetes [FreeTextEntry2] : 9/21/20 [FreeTextEntry3] : Gloria Dillard [FreeTextEntry4] : 64 y/o female here prior to abd hysterectomy.\par \par Had had fibroid uterus x many years.  Recent CT in May (after gross hematuria) showed \par \par CT REPORT: Markedly enlarged uterus secondary to probable tumor within the endometrium measuring at least 14.3 x 8.1 x 15.2 cm.  Uterine fibroids. One is calcified.\par  [FreeTextEntry7] : CT Angio 12.6.2019:\par IMPRESSION: \par 1. Coronary artery calcium score = 27 Agatston Units (70th percentile for subjects of the same age, gender, \par and race/ethnicity who are free of clinical cardiovascular disease and treated diabetes). \par 2. Coronaries: \par --LM: contains luminal irregularities but is patent. \par --LAD: contains mixed plaque in the mid segment with mild (less than 50%) luminal stenosis. There is\par myocardial bridging of the ctd-lq-[id number] LAD. \par --LCX: proximal LCX contains luminal irregularities but is patent; distal LCX is very small in caliber and was not \par well visualized. \par --RCA: assessment somewhat limited by motion artifact; grossly, the RCA contains non-calcified plaque in the \par mid segment with mild (less than 50%) luminal stenosis.

## 2020-09-11 ENCOUNTER — OUTPATIENT (OUTPATIENT)
Dept: OUTPATIENT SERVICES | Facility: HOSPITAL | Age: 65
LOS: 1 days | End: 2020-09-11

## 2020-09-11 VITALS
WEIGHT: 104.06 LBS | OXYGEN SATURATION: 97 % | RESPIRATION RATE: 16 BRPM | SYSTOLIC BLOOD PRESSURE: 108 MMHG | HEIGHT: 59 IN | DIASTOLIC BLOOD PRESSURE: 62 MMHG | TEMPERATURE: 97 F | HEART RATE: 66 BPM

## 2020-09-11 DIAGNOSIS — Z98.890 OTHER SPECIFIED POSTPROCEDURAL STATES: Chronic | ICD-10-CM

## 2020-09-11 DIAGNOSIS — D25.0 SUBMUCOUS LEIOMYOMA OF UTERUS: ICD-10-CM

## 2020-09-11 DIAGNOSIS — Z90.89 ACQUIRED ABSENCE OF OTHER ORGANS: Chronic | ICD-10-CM

## 2020-09-11 LAB
BLD GP AB SCN SERPL QL: NEGATIVE — SIGNIFICANT CHANGE UP
RH IG SCN BLD-IMP: NEGATIVE — SIGNIFICANT CHANGE UP

## 2020-09-11 RX ORDER — SODIUM CHLORIDE 9 MG/ML
1000 INJECTION, SOLUTION INTRAVENOUS
Refills: 0 | Status: DISCONTINUED | OUTPATIENT
Start: 2020-09-21 | End: 2020-09-21

## 2020-09-11 RX ORDER — SODIUM CHLORIDE 9 MG/ML
3 INJECTION INTRAMUSCULAR; INTRAVENOUS; SUBCUTANEOUS EVERY 8 HOURS
Refills: 0 | Status: DISCONTINUED | OUTPATIENT
Start: 2020-09-21 | End: 2020-09-23

## 2020-09-11 NOTE — H&P PST ADULT - NSICDXPASTMEDICALHX_GEN_ALL_CORE_FT
PAST MEDICAL HISTORY:  HLD (hyperlipidemia)     Hypothyroid     Osteopenia PAST MEDICAL HISTORY:  HLD (hyperlipidemia)     Hypothyroid     Ocular migraine     Osteopenia

## 2020-09-11 NOTE — H&P PST ADULT - NEGATIVE GASTROINTESTINAL SYMPTOMS
no constipation/no abdominal pain/no nausea/no change in bowel habits/no melena/no hematochezia/no jaundice/no vomiting/no diarrhea

## 2020-09-11 NOTE — H&P PST ADULT - NSICDXPASTSURGICALHX_GEN_ALL_CORE_FT
PAST SURGICAL HISTORY:  H/O ovarian cystectomy 1980s PAST SURGICAL HISTORY:  H/O ovarian cystectomy 1980s    History of strabismus surgery right eye; age 8    History of tonsillectomy at age 5    S/P cystoscopy ureteroscopy;  06/2020

## 2020-09-11 NOTE — H&P PST ADULT - NEGATIVE CARDIOVASCULAR SYMPTOMS
no paroxysmal nocturnal dyspnea/no peripheral edema/no dyspnea on exertion/no orthopnea/no palpitations/no chest pain

## 2020-09-11 NOTE — H&P PST ADULT - NSICDXFAMILYHX_GEN_ALL_CORE_FT
FAMILY HISTORY:  Family history of essential hypertension, Mother and Father  FH: CAD (coronary artery disease), Father

## 2020-09-11 NOTE — H&P PST ADULT - NEGATIVE NEUROLOGICAL SYMPTOMS
no syncope/no difficulty walking/no tremors/no vertigo/no loss of sensation/no focal seizures/no generalized seizures/no paresthesias

## 2020-09-11 NOTE — H&P PST ADULT - RS GEN PE MLT RESP DETAILS PC
good air movement/no wheezes/no intercostal retractions/no rales/clear to auscultation bilaterally/breath sounds equal/airway patent/no rhonchi/no subcutaneous emphysema/respirations non-labored

## 2020-09-11 NOTE — H&P PST ADULT - NEGATIVE ENMT SYMPTOMS
no tinnitus/no sinus symptoms/no nose bleeds/no ear pain/no vertigo/no hearing difficulty/no nasal congestion/no nasal obstruction/no post-nasal discharge/no gum bleeding/no recurrent cold sores no nose bleeds/no vertigo/no nasal congestion/no nasal obstruction/no ear pain/no tinnitus/no throat pain/no sinus symptoms/no post-nasal discharge/no gum bleeding/no recurrent cold sores/no hearing difficulty

## 2020-09-11 NOTE — H&P PST ADULT - ATTENDING COMMENTS
Shortly before surgery, I met with this patient and discussed the planned procedure, including exam under anesthesia by myself and learners (resident/medical student).  Risks of the procedure were reviewed, including but not limited to bleeding, infection, damage to surrounding organs.  All questions were answered.

## 2020-09-11 NOTE — H&P PST ADULT - NEGATIVE GENERAL GENITOURINARY SYMPTOMS
no nocturia/no hematuria/no flank pain L/no bladder infections/no urine discoloration/no urinary hesitancy/normal urinary frequency/no dysuria no dysuria/no urinary hesitancy/normal urinary frequency/no nocturia/no hematuria/no renal colic/no flank pain L/no urine discoloration/no bladder infections

## 2020-09-11 NOTE — H&P PST ADULT - NSICDXPROBLEM_GEN_ALL_CORE_FT
PROBLEM DIAGNOSES  Problem: Submucous leiomyoma of uterus  Assessment and Plan: Scheduled for total abdominal hysterectomy, Bilateral Salpingo Oophorectomy on 09/21/20. Pre op instructions, famotidine, chlorhexidine gluconate soap given and explained. Pt verbalized understanding.   Pt will check with PCP regarding Covid-10 swab pre op  Medical consultation in chart  Pending lab results from PCP. Type & Screen done at PST.

## 2020-09-11 NOTE — H&P PST ADULT - HISTORY OF PRESENT ILLNESS
65 year old  female     srikanth with PMH hypothyroidism, HLD and osteopenia who has been experiencing painless hematuria now scheduled for Cystoscopy, bilateral ureteroscopy and selective cystologies.    "large fibroids > 10 years. "    Pending COVID testing done 6/29 at her spouse's office Dr. Jamar Hernández, results are pending.  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    Covid virus test is negative from 6/29/20. 65 year old  female with PMHx of Hypothyroidism, Dyslipidemia presents to [facility] for pre op evaluation with h/o     o"large fibroids > 10 years. "    Pending COVID testing done 6/29 at her spouse's office Dr. Jamar Hernández, results are pending.  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    Covid virus test is negative from 6/29/20.

## 2020-09-18 NOTE — ASU PATIENT PROFILE, ADULT - PSH
H/O ovarian cystectomy  1980s  History of strabismus surgery  right eye; age 8  History of tonsillectomy  at age 5  S/P cystoscopy  ureteroscopy;  06/2020

## 2020-09-19 LAB — SARS-COV-2 N GENE NPH QL NAA+PROBE: NOT DETECTED

## 2020-09-20 ENCOUNTER — TRANSCRIPTION ENCOUNTER (OUTPATIENT)
Age: 65
End: 2020-09-20

## 2020-09-21 ENCOUNTER — INPATIENT (INPATIENT)
Facility: HOSPITAL | Age: 65
LOS: 1 days | Discharge: HOME | End: 2020-09-23
Attending: OBSTETRICS & GYNECOLOGY | Admitting: OBSTETRICS & GYNECOLOGY
Payer: COMMERCIAL

## 2020-09-21 ENCOUNTER — RESULT REVIEW (OUTPATIENT)
Age: 65
End: 2020-09-21

## 2020-09-21 VITALS
WEIGHT: 104.06 LBS | HEIGHT: 59 IN | SYSTOLIC BLOOD PRESSURE: 111 MMHG | HEART RATE: 67 BPM | TEMPERATURE: 98 F | OXYGEN SATURATION: 99 % | RESPIRATION RATE: 16 BRPM | DIASTOLIC BLOOD PRESSURE: 43 MMHG

## 2020-09-21 DIAGNOSIS — Z98.890 OTHER SPECIFIED POSTPROCEDURAL STATES: Chronic | ICD-10-CM

## 2020-09-21 DIAGNOSIS — D25.0 SUBMUCOUS LEIOMYOMA OF UTERUS: ICD-10-CM

## 2020-09-21 DIAGNOSIS — Z90.89 ACQUIRED ABSENCE OF OTHER ORGANS: Chronic | ICD-10-CM

## 2020-09-21 LAB — RH IG SCN BLD-IMP: NEGATIVE — SIGNIFICANT CHANGE UP

## 2020-09-21 PROCEDURE — 88305 TISSUE EXAM BY PATHOLOGIST: CPT | Mod: 26

## 2020-09-21 RX ORDER — HEPARIN SODIUM 5000 [USP'U]/ML
5000 INJECTION INTRAVENOUS; SUBCUTANEOUS EVERY 12 HOURS
Refills: 0 | Status: DISCONTINUED | OUTPATIENT
Start: 2020-09-21 | End: 2020-09-22

## 2020-09-21 RX ORDER — SIMETHICONE 80 MG/1
80 TABLET, CHEWABLE ORAL DAILY
Refills: 0 | Status: DISCONTINUED | OUTPATIENT
Start: 2020-09-21 | End: 2020-09-23

## 2020-09-21 RX ORDER — HEPARIN SODIUM 5000 [USP'U]/ML
5000 INJECTION INTRAVENOUS; SUBCUTANEOUS ONCE
Refills: 0 | Status: COMPLETED | OUTPATIENT
Start: 2020-09-21 | End: 2020-09-21

## 2020-09-21 RX ORDER — ACETAMINOPHEN 500 MG
975 TABLET ORAL EVERY 6 HOURS
Refills: 0 | Status: DISCONTINUED | OUTPATIENT
Start: 2020-09-21 | End: 2020-09-23

## 2020-09-21 RX ORDER — ONDANSETRON 8 MG/1
4 TABLET, FILM COATED ORAL EVERY 6 HOURS
Refills: 0 | Status: DISCONTINUED | OUTPATIENT
Start: 2020-09-21 | End: 2020-09-23

## 2020-09-21 RX ORDER — INFLUENZA VIRUS VACCINE 15; 15; 15; 15 UG/.5ML; UG/.5ML; UG/.5ML; UG/.5ML
0.5 SUSPENSION INTRAMUSCULAR ONCE
Refills: 0 | Status: DISCONTINUED | OUTPATIENT
Start: 2020-09-21 | End: 2020-09-23

## 2020-09-21 RX ORDER — SENNA PLUS 8.6 MG/1
2 TABLET ORAL AT BEDTIME
Refills: 0 | Status: DISCONTINUED | OUTPATIENT
Start: 2020-09-21 | End: 2020-09-23

## 2020-09-21 RX ORDER — DEXAMETHASONE 0.5 MG/5ML
6 ELIXIR ORAL ONCE
Refills: 0 | Status: DISCONTINUED | OUTPATIENT
Start: 2020-09-21 | End: 2020-09-23

## 2020-09-21 RX ORDER — LEVOTHYROXINE SODIUM 125 MCG
1 TABLET ORAL
Qty: 0 | Refills: 0 | DISCHARGE

## 2020-09-21 RX ORDER — HYDROMORPHONE HYDROCHLORIDE 2 MG/ML
30 INJECTION INTRAMUSCULAR; INTRAVENOUS; SUBCUTANEOUS
Refills: 0 | Status: DISCONTINUED | OUTPATIENT
Start: 2020-09-21 | End: 2020-09-22

## 2020-09-21 RX ORDER — METOCLOPRAMIDE HCL 10 MG
10 TABLET ORAL ONCE
Refills: 0 | Status: COMPLETED | OUTPATIENT
Start: 2020-09-21 | End: 2020-09-21

## 2020-09-21 RX ORDER — ACETAMINOPHEN 500 MG
650 TABLET ORAL ONCE
Refills: 0 | Status: COMPLETED | OUTPATIENT
Start: 2020-09-21 | End: 2020-09-21

## 2020-09-21 RX ORDER — NALOXONE HYDROCHLORIDE 4 MG/.1ML
0.1 SPRAY NASAL
Refills: 0 | Status: DISCONTINUED | OUTPATIENT
Start: 2020-09-21 | End: 2020-09-23

## 2020-09-21 RX ORDER — KETOROLAC TROMETHAMINE 30 MG/ML
30 SYRINGE (ML) INJECTION EVERY 6 HOURS
Refills: 0 | Status: DISCONTINUED | OUTPATIENT
Start: 2020-09-21 | End: 2020-09-22

## 2020-09-21 RX ORDER — HYDROMORPHONE HYDROCHLORIDE 2 MG/ML
0.5 INJECTION INTRAMUSCULAR; INTRAVENOUS; SUBCUTANEOUS
Refills: 0 | Status: DISCONTINUED | OUTPATIENT
Start: 2020-09-21 | End: 2020-09-22

## 2020-09-21 RX ORDER — ROSUVASTATIN CALCIUM 5 MG/1
1 TABLET ORAL
Qty: 0 | Refills: 0 | DISCHARGE

## 2020-09-21 RX ORDER — ESTRADIOL 4 UG/1
0 INSERT VAGINAL
Qty: 0 | Refills: 0 | DISCHARGE

## 2020-09-21 RX ORDER — CHOLECALCIFEROL (VITAMIN D3) 125 MCG
0 CAPSULE ORAL
Qty: 0 | Refills: 0 | DISCHARGE

## 2020-09-21 RX ORDER — SODIUM CHLORIDE 9 MG/ML
1000 INJECTION, SOLUTION INTRAVENOUS
Refills: 0 | Status: DISCONTINUED | OUTPATIENT
Start: 2020-09-21 | End: 2020-09-22

## 2020-09-21 RX ADMIN — Medication 975 MILLIGRAM(S): at 23:55

## 2020-09-21 RX ADMIN — HEPARIN SODIUM 5000 UNIT(S): 5000 INJECTION INTRAVENOUS; SUBCUTANEOUS at 11:46

## 2020-09-21 RX ADMIN — SODIUM CHLORIDE 3 MILLILITER(S): 9 INJECTION INTRAMUSCULAR; INTRAVENOUS; SUBCUTANEOUS at 21:36

## 2020-09-21 RX ADMIN — HEPARIN SODIUM 5000 UNIT(S): 5000 INJECTION INTRAVENOUS; SUBCUTANEOUS at 23:26

## 2020-09-21 RX ADMIN — Medication 975 MILLIGRAM(S): at 19:35

## 2020-09-21 RX ADMIN — SENNA PLUS 2 TABLET(S): 8.6 TABLET ORAL at 23:26

## 2020-09-21 RX ADMIN — SODIUM CHLORIDE 125 MILLILITER(S): 9 INJECTION, SOLUTION INTRAVENOUS at 20:09

## 2020-09-21 RX ADMIN — Medication 30 MILLIGRAM(S): at 22:00

## 2020-09-21 RX ADMIN — HYDROMORPHONE HYDROCHLORIDE 30 MILLILITER(S): 2 INJECTION INTRAMUSCULAR; INTRAVENOUS; SUBCUTANEOUS at 22:04

## 2020-09-21 RX ADMIN — Medication 10 MILLIGRAM(S): at 22:27

## 2020-09-21 RX ADMIN — Medication 975 MILLIGRAM(S): at 23:25

## 2020-09-21 RX ADMIN — ONDANSETRON 4 MILLIGRAM(S): 8 TABLET, FILM COATED ORAL at 17:11

## 2020-09-21 RX ADMIN — HYDROMORPHONE HYDROCHLORIDE 0.5 MILLIGRAM(S): 2 INJECTION INTRAMUSCULAR; INTRAVENOUS; SUBCUTANEOUS at 17:21

## 2020-09-21 RX ADMIN — HYDROMORPHONE HYDROCHLORIDE 0.5 MILLIGRAM(S): 2 INJECTION INTRAMUSCULAR; INTRAVENOUS; SUBCUTANEOUS at 17:06

## 2020-09-21 RX ADMIN — Medication 30 MILLIGRAM(S): at 22:30

## 2020-09-21 RX ADMIN — HYDROMORPHONE HYDROCHLORIDE 30 MILLILITER(S): 2 INJECTION INTRAMUSCULAR; INTRAVENOUS; SUBCUTANEOUS at 19:36

## 2020-09-21 RX ADMIN — Medication 975 MILLIGRAM(S): at 20:15

## 2020-09-21 NOTE — ASU PREOP CHECKLIST - SELECT TESTS ORDERED
EKG/Type and Screen CBC/INR/Urinalysis/EKG/CMP/Type and Screen/PT/PTT CBC/PT/PTT/CMP/Type and Cross/Type and Screen/INR/Urinalysis/EKG

## 2020-09-21 NOTE — CHART NOTE - NSCHARTNOTEFT_GEN_A_CORE
OBGYN Attending    Pt undergoing RYAN/BSO.  Bleeding noted adjacent to right ureter intra-op, hemostasis was obtained with suture ligation and placement of hemoclips.  Ureters were noted to vermiculate bilaterally.  Decision was made to perform unplanned cystoscopy to ensure that bilateral ureteral efflux intact.  Bladder noted to be intact and bilateral ureteral efflux seen.  After cystoscopy, surgical bed was inspected and noted to be hemostatic, and the decision was made to close.  Adter completion of procedure, I spoke to pt's daughter, Dr. Diana Hernández, and informed her of unplanned portion of procedure, which was uncomplicated.    FRANCISCA Dillard MD

## 2020-09-21 NOTE — CHART NOTE - NSCHARTNOTEFT_GEN_A_CORE
R2 GYN POST OP CHECK    Patient seen and examined at bedside, recently post-op. No acute complaints at this time. Denies CP, SOB, N/V, fevers, and chills.    Vital Signs Last 24 Hours  T(C): 36 (09-21-20 @ 16:50), Max: 36.7 (09-21-20 @ 10:44)  HR: 84 (09-21-20 @ 18:45) (67 - 86)  BP: 103/52 (09-21-20 @ 18:45) (92/47 - 112/44)  RR: 13 (09-21-20 @ 18:45) (11 - 21)  SpO2: 99% (09-21-20 @ 18:45) (93% - 100%)    I&O's Summary    21 Sep 2020 07:01  -  21 Sep 2020 19:39  --------------------------------------------------------  IN: 250 mL / OUT: 350 mL / NET: -100 mL        Physical Exam:  General: NAD  CV: NR, RR, S1, S2, no M/R/G  Lungs: CTA-B  Abdomen: Soft, appropriately tender, non-distended, +BS  Incision: Pfannesteil incision CDI  Ext: No pain or swelling      MEDICATIONS  (STANDING):  acetaminophen   Tablet .. 975 milliGRAM(s) Oral every 6 hours  heparin   Injectable 5000 Unit(s) SubCutaneous every 12 hours  HYDROmorphone PCA (1 mG/mL) 30 milliLiter(s) PCA Continuous PCA Continuous  ketorolac   Injectable 30 milliGRAM(s) IV Push every 6 hours  lactated ringers. 1000 milliLiter(s) (125 mL/Hr) IV Continuous <Continuous>  senna 2 Tablet(s) Oral at bedtime  simethicone 80 milliGRAM(s) Chew daily  sodium chloride 0.9% lock flush 3 milliLiter(s) IV Push every 8 hours    MEDICATIONS  (PRN):  HYDROmorphone  Injectable 0.5 milliGRAM(s) IV Push every 10 minutes PRN Severe Pain (7 - 10)  HYDROmorphone PCA (1 mG/mL) Rescue Clinician Bolus 0.5 milliGRAM(s) IV Push every 15 minutes PRN for Pain Scale GREATER THAN 6  naloxone Injectable 0.1 milliGRAM(s) IV Push every 3 minutes PRN For ANY of the following changes in patient status:  A. RR LESS THAN 10 breaths per minute, B. Oxygen saturation LESS THAN 90%, C. Sedation score of 6  ondansetron Injectable 4 milliGRAM(s) IV Push every 6 hours PRN Nausea    65yoF s/p Total Abdominal Hysterectomy Bilateral-Salpingoopherectomy and cystoscopy [id number] currently doing well post-operatively.  Neuro: c/w PCA, Tylenol, toraldol  CV: VSS, CBC in AM  Pulm: Saturating well on room air, encourage incentive spirometry  GI: Regular diet  : UOP adequate, c/w roberts until POD#1  Heme: HSQ and SCDs for DVT ppx  Dispo: Continue routine post-op care  Kiko Mock PGY2

## 2020-09-22 DIAGNOSIS — Z90.710 ACQUIRED ABSENCE OF BOTH CERVIX AND UTERUS: ICD-10-CM

## 2020-09-22 DIAGNOSIS — E03.9 HYPOTHYROIDISM, UNSPECIFIED: ICD-10-CM

## 2020-09-22 DIAGNOSIS — E78.5 HYPERLIPIDEMIA, UNSPECIFIED: ICD-10-CM

## 2020-09-22 LAB
ANION GAP SERPL CALC-SCNC: 11 MMO/L — SIGNIFICANT CHANGE UP (ref 7–14)
BUN SERPL-MCNC: 10 MG/DL — SIGNIFICANT CHANGE UP (ref 7–23)
CALCIUM SERPL-MCNC: 8.5 MG/DL — SIGNIFICANT CHANGE UP (ref 8.4–10.5)
CHLORIDE SERPL-SCNC: 105 MMOL/L — SIGNIFICANT CHANGE UP (ref 98–107)
CO2 SERPL-SCNC: 25 MMOL/L — SIGNIFICANT CHANGE UP (ref 22–31)
CREAT SERPL-MCNC: 0.65 MG/DL — SIGNIFICANT CHANGE UP (ref 0.5–1.3)
GLUCOSE SERPL-MCNC: 117 MG/DL — HIGH (ref 70–99)
HCT VFR BLD CALC: 30.8 % — LOW (ref 34.5–45)
HGB BLD-MCNC: 9.8 G/DL — LOW (ref 11.5–15.5)
MAGNESIUM SERPL-MCNC: 1.4 MG/DL — LOW (ref 1.6–2.6)
MCHC RBC-ENTMCNC: 29.2 PG — SIGNIFICANT CHANGE UP (ref 27–34)
MCHC RBC-ENTMCNC: 31.8 % — LOW (ref 32–36)
MCV RBC AUTO: 91.7 FL — SIGNIFICANT CHANGE UP (ref 80–100)
NRBC # FLD: 0 K/UL — SIGNIFICANT CHANGE UP (ref 0–0)
PHOSPHATE SERPL-MCNC: 2.9 MG/DL — SIGNIFICANT CHANGE UP (ref 2.5–4.5)
PLATELET # BLD AUTO: 164 K/UL — SIGNIFICANT CHANGE UP (ref 150–400)
PMV BLD: 9.3 FL — SIGNIFICANT CHANGE UP (ref 7–13)
POTASSIUM SERPL-MCNC: 4.1 MMOL/L — SIGNIFICANT CHANGE UP (ref 3.5–5.3)
POTASSIUM SERPL-SCNC: 4.1 MMOL/L — SIGNIFICANT CHANGE UP (ref 3.5–5.3)
RBC # BLD: 3.36 M/UL — LOW (ref 3.8–5.2)
RBC # FLD: 13.2 % — SIGNIFICANT CHANGE UP (ref 10.3–14.5)
SODIUM SERPL-SCNC: 141 MMOL/L — SIGNIFICANT CHANGE UP (ref 135–145)
WBC # BLD: 7.39 K/UL — SIGNIFICANT CHANGE UP (ref 3.8–10.5)
WBC # FLD AUTO: 7.39 K/UL — SIGNIFICANT CHANGE UP (ref 3.8–10.5)

## 2020-09-22 PROCEDURE — 99223 1ST HOSP IP/OBS HIGH 75: CPT

## 2020-09-22 RX ORDER — IBUPROFEN 200 MG
600 TABLET ORAL EVERY 6 HOURS
Refills: 0 | Status: DISCONTINUED | OUTPATIENT
Start: 2020-09-22 | End: 2020-09-23

## 2020-09-22 RX ORDER — ENOXAPARIN SODIUM 100 MG/ML
40 INJECTION SUBCUTANEOUS DAILY
Refills: 0 | Status: DISCONTINUED | OUTPATIENT
Start: 2020-09-22 | End: 2020-09-23

## 2020-09-22 RX ORDER — HYDROMORPHONE HYDROCHLORIDE 2 MG/ML
0.25 INJECTION INTRAMUSCULAR; INTRAVENOUS; SUBCUTANEOUS
Refills: 0 | Status: DISCONTINUED | OUTPATIENT
Start: 2020-09-22 | End: 2020-09-23

## 2020-09-22 RX ORDER — OXYCODONE HYDROCHLORIDE 5 MG/1
5 TABLET ORAL
Refills: 0 | Status: DISCONTINUED | OUTPATIENT
Start: 2020-09-22 | End: 2020-09-23

## 2020-09-22 RX ADMIN — Medication 30 MILLIGRAM(S): at 05:27

## 2020-09-22 RX ADMIN — Medication 975 MILLIGRAM(S): at 07:53

## 2020-09-22 RX ADMIN — Medication 30 MILLIGRAM(S): at 12:33

## 2020-09-22 RX ADMIN — Medication 975 MILLIGRAM(S): at 17:16

## 2020-09-22 RX ADMIN — Medication 975 MILLIGRAM(S): at 13:00

## 2020-09-22 RX ADMIN — ENOXAPARIN SODIUM 40 MILLIGRAM(S): 100 INJECTION SUBCUTANEOUS at 07:59

## 2020-09-22 RX ADMIN — Medication 975 MILLIGRAM(S): at 12:31

## 2020-09-22 RX ADMIN — SODIUM CHLORIDE 3 MILLILITER(S): 9 INJECTION INTRAMUSCULAR; INTRAVENOUS; SUBCUTANEOUS at 23:05

## 2020-09-22 RX ADMIN — Medication 600 MILLIGRAM(S): at 23:05

## 2020-09-22 RX ADMIN — HYDROMORPHONE HYDROCHLORIDE 30 MILLILITER(S): 2 INJECTION INTRAMUSCULAR; INTRAVENOUS; SUBCUTANEOUS at 07:41

## 2020-09-22 RX ADMIN — SENNA PLUS 2 TABLET(S): 8.6 TABLET ORAL at 23:05

## 2020-09-22 RX ADMIN — Medication 975 MILLIGRAM(S): at 05:27

## 2020-09-22 RX ADMIN — SODIUM CHLORIDE 3 MILLILITER(S): 9 INJECTION INTRAMUSCULAR; INTRAVENOUS; SUBCUTANEOUS at 05:28

## 2020-09-22 RX ADMIN — Medication 600 MILLIGRAM(S): at 18:36

## 2020-09-22 RX ADMIN — Medication 975 MILLIGRAM(S): at 18:36

## 2020-09-22 RX ADMIN — Medication 30 MILLIGRAM(S): at 13:00

## 2020-09-22 RX ADMIN — Medication 600 MILLIGRAM(S): at 17:16

## 2020-09-22 RX ADMIN — SODIUM CHLORIDE 3 MILLILITER(S): 9 INJECTION INTRAMUSCULAR; INTRAVENOUS; SUBCUTANEOUS at 12:28

## 2020-09-22 RX ADMIN — Medication 30 MILLIGRAM(S): at 07:53

## 2020-09-22 RX ADMIN — SIMETHICONE 80 MILLIGRAM(S): 80 TABLET, CHEWABLE ORAL at 12:31

## 2020-09-22 RX ADMIN — Medication 975 MILLIGRAM(S): at 23:07

## 2020-09-22 NOTE — PROGRESS NOTE ADULT - SUBJECTIVE AND OBJECTIVE BOX
Patient seen and examined at bedside, POD#1. No acute complaints at this time. Denies CP, SOB, N/V, fevers, and chills.    Vital Signs Last 24 Hours  T(C): 37.2 (09-22-20 @ 04:00), Max: 37.2 (09-22-20 @ 04:00)  HR: 78 (09-22-20 @ 04:00) (66 - 86)  BP: 110/48 (09-22-20 @ 04:00) (92/47 - 112/44)  RR: 18 (09-22-20 @ 04:00) (11 - 21)  SpO2: 97% (09-22-20 @ 04:00) (84% - 100%)    I&O's Summary    21 Sep 2020 07:01  -  22 Sep 2020 07:00  --------------------------------------------------------  IN: 500 mL / OUT: 575 mL / NET: -75 mL      Physical Exam:  General: NAD  CV: NR, RR, S1, S2, no M/R/G  Lungs: CTA-B  Abdomen: Soft, appropriately tender, non-distended  Incision: LT incision w/ dermabond CDI  Ext: No pain or swelling      MEDICATIONS  (STANDING):  acetaminophen   Tablet .. 975 milliGRAM(s) Oral every 6 hours  heparin   Injectable 5000 Unit(s) SubCutaneous every 12 hours  HYDROmorphone PCA (1 mG/mL) 30 milliLiter(s) PCA Continuous PCA Continuous  influenza   Vaccine 0.5 milliLiter(s) IntraMuscular once  ketorolac   Injectable 30 milliGRAM(s) IV Push every 6 hours  lactated ringers. 1000 milliLiter(s) (125 mL/Hr) IV Continuous <Continuous>  senna 2 Tablet(s) Oral at bedtime  simethicone 80 milliGRAM(s) Chew daily  sodium chloride 0.9% lock flush 3 milliLiter(s) IV Push every 8 hours    MEDICATIONS  (PRN):  dexAMETHasone  Injectable 6 milliGRAM(s) IV Push once PRN nausea/vomiting  HYDROmorphone  Injectable 0.5 milliGRAM(s) IV Push every 10 minutes PRN Severe Pain (7 - 10)  HYDROmorphone PCA (1 mG/mL) Rescue Clinician Bolus 0.5 milliGRAM(s) IV Push every 15 minutes PRN for Pain Scale GREATER THAN 6  naloxone Injectable 0.1 milliGRAM(s) IV Push every 3 minutes PRN For ANY of the following changes in patient status:  A. RR LESS THAN 10 breaths per minute, B. Oxygen saturation LESS THAN 90%, C. Sedation score of 6  ondansetron Injectable 4 milliGRAM(s) IV Push every 6 hours PRN Nausea

## 2020-09-22 NOTE — PROGRESS NOTE ADULT - SUBJECTIVE AND OBJECTIVE BOX
ANESTHESIA POSTOP CHECK    65y Female POSTOP DAY 1 S/P     [ X] NO APPARENT ANESTHESIA COMPLICATIONS      Comments:

## 2020-09-22 NOTE — PROGRESS NOTE ADULT - SUBJECTIVE AND OBJECTIVE BOX
Anesthesia Pain Management Service- Attending Addendum    SUBJECTIVE: Patient's pain control adequate    Therapy:	  [ X] IV PCA	   [ ] Epidural           [ ] s/p Spinal Opoid              [ ] Postpartum infusion	  [ ] Patient controlled regional anesthesia (PCRA)    [ ] prn Analgesics    Allergies    cephalosporins (Rash)  penicillin (Rash)    Intolerances      MEDICATIONS  (STANDING):  acetaminophen   Tablet .. 975 milliGRAM(s) Oral every 6 hours  enoxaparin Injectable 40 milliGRAM(s) SubCutaneous daily  influenza   Vaccine 0.5 milliLiter(s) IntraMuscular once  ketorolac   Injectable 30 milliGRAM(s) IV Push every 6 hours  senna 2 Tablet(s) Oral at bedtime  simethicone 80 milliGRAM(s) Chew daily  sodium chloride 0.9% lock flush 3 milliLiter(s) IV Push every 8 hours    MEDICATIONS  (PRN):  dexAMETHasone  Injectable 6 milliGRAM(s) IV Push once PRN nausea/vomiting  HYDROmorphone  Injectable 0.25 milliGRAM(s) IV Push every 3 hours PRN Severe breakthrough Pain (7 - 10)  naloxone Injectable 0.1 milliGRAM(s) IV Push every 3 minutes PRN For ANY of the following changes in patient status:  A. RR LESS THAN 10 breaths per minute, B. Oxygen saturation LESS THAN 90%, C. Sedation score of 6  ondansetron Injectable 4 milliGRAM(s) IV Push every 6 hours PRN Nausea  oxyCODONE    IR 5 milliGRAM(s) Oral every 3 hours PRN Severe Pain (7 - 10)      OBJECTIVE:   [X] No new signs     [ ] Other:    Side Effects:  [X ] None			[ ] Other:      ASSESSMENT/PLAN  -Discontinue current therapy    [ ] Therapy changed to:    [ ] IV PCA       [ ] Epidural     [ X] prn Analgesics     Comments: Pain management per primary team, APS to sign off

## 2020-09-22 NOTE — SBIRT NOTE ADULT - NSSBIRTALCPOSREINDET_GEN_A_CORE
Pt AAOx3, euthymic mood and congruent affect, speech clear and concise, behavior appropriate to this writer. Pt receptive to engagement in consult and agreeable to reviewing healthy drinking guidelines.   Provided SBIRT services: Full screen Positive. Positive reinforcement provided given patient currently within healthy guidelines. Education materials reviewed and given to patient.

## 2020-09-22 NOTE — PROGRESS NOTE ADULT - SUBJECTIVE AND OBJECTIVE BOX
Anesthesia Pain Management Service    SUBJECTIVE: Patient is doing well with IV PCA and no significant problems reported.  Patient tolerating po diet.    Pain Scale Score	At rest: _2/10__ 	With Activity: ___ 	[X ] Refer to charted pain scores    THERAPY:    [ ] IV PCA Morphine		[ ] 5 mg/mL	[ ] 1 mg/mL  [X ] IV PCA Hydromorphone	[ ] 5 mg/mL	[X ] 1 mg/mL  [ ] IV PCA Fentanyl		[ ] 50 micrograms/mL    Demand dose __0.2_ lockout __6_ (minutes) Continuous Rate _0__ Total: __0.6_   mg used (in past 24 hrs)      MEDICATIONS  (STANDING):  acetaminophen   Tablet .. 975 milliGRAM(s) Oral every 6 hours  enoxaparin Injectable 40 milliGRAM(s) SubCutaneous daily  influenza   Vaccine 0.5 milliLiter(s) IntraMuscular once  ketorolac   Injectable 30 milliGRAM(s) IV Push every 6 hours  lactated ringers. 1000 milliLiter(s) (125 mL/Hr) IV Continuous <Continuous>  senna 2 Tablet(s) Oral at bedtime  simethicone 80 milliGRAM(s) Chew daily  sodium chloride 0.9% lock flush 3 milliLiter(s) IV Push every 8 hours    MEDICATIONS  (PRN):  dexAMETHasone  Injectable 6 milliGRAM(s) IV Push once PRN nausea/vomiting  HYDROmorphone  Injectable 0.25 milliGRAM(s) IV Push every 3 hours PRN Severe breakthrough Pain (7 - 10)  naloxone Injectable 0.1 milliGRAM(s) IV Push every 3 minutes PRN For ANY of the following changes in patient status:  A. RR LESS THAN 10 breaths per minute, B. Oxygen saturation LESS THAN 90%, C. Sedation score of 6  ondansetron Injectable 4 milliGRAM(s) IV Push every 6 hours PRN Nausea  oxyCODONE    IR 5 milliGRAM(s) Oral every 3 hours PRN Severe Pain (7 - 10)      OBJECTIVE:  Patient is sitting up in chair.    Sedation Score:	[ X] Alert	[ ] Drowsy 	[ ] Arousable	[ ] Asleep	[ ] Unresponsive    Side Effects:	[X ] None	[ ] Nausea	[ ] Vomiting	[ ] Pruritus  		[ ] Other:    Vital Signs Last 24 Hrs  T(C): 36.8 (22 Sep 2020 08:04), Max: 37.2 (22 Sep 2020 04:00)  T(F): 98.3 (22 Sep 2020 08:04), Max: 98.9 (22 Sep 2020 04:00)  HR: 76 (22 Sep 2020 08:04) (66 - 86)  BP: 115/43 (22 Sep 2020 08:04) (92/47 - 115/43)  BP(mean): 61 (21 Sep 2020 21:15) (55 - 71)  RR: 19 (22 Sep 2020 08:04) (11 - 21)  SpO2: 100% (22 Sep 2020 08:04) (84% - 100%)    ASSESSMENT/ PLAN    Therapy to  be:	[ ] Continue   [ X] Discontinued   [X ] Change to prn Analgesics    Documentation and Verification of current medications:   [X] Done	[ ] Not done, not elligible    Comments: Discussed patient with team, and ok to discontinue IV PCA. PRN Oral/IV opioids and/or Adjuvant non-opioid medication to be ordered at this point.

## 2020-09-22 NOTE — DIETITIAN INITIAL EVALUATION ADULT. - OTHER INFO
A nutrition consult was requested for Bariatric Surgery. Pt has a history of hypothyroidism, dyslipidemia and large fibroids. Pt is s/p RYAN and BSO.  Pt had no complaints of GI distress nor difficulty chewing or swallowing. Pt's appetite and po intake have been good. Pt does not follow a therapeutic diet but does control the amount of carbohydrates she eats. She has had no  recent changes in weight. Pt avoids nuts because the cause her mouth to become inflamed. Pt's skin is intact except for the surgical incision. No edema was noted.

## 2020-09-22 NOTE — PROGRESS NOTE ADULT - PROBLEM SELECTOR PLAN 1
Neuro: currently using PCA for pain control, transition to po pain meds  CV: Hemodynamically stable  Pulm: Saturating well on room air, encourage incentive spirometry  GI: Advance to regular diet  : UOP adequate, d/c roberts after am labs result  Heme: c/w HSQ and SCDs for DVT ppx  Dispo: Continue routine post-op care, f/u AM CBC/BMP    Argenis Fernandez,PGY1

## 2020-09-22 NOTE — CONSULT NOTE ADULT - SUBJECTIVE AND OBJECTIVE BOX
65F hypothyroidism, dyslipidemia, and fibroids who is now s/p RYAN, BSO, cystoscopy. Pt tolerated well, and currently denies significant pain, bleeding, dyspnea, or other symptoms. Pt has been tolerating regular diet, +flatus, no BM as yet. Ambulating in room independently. Generally pt reports excellent health. She has been on a stable dose of levoxyl for many years and has had no recent illness or sick contacts.    Allergies/Medications:   Allergies:        Allergies:  	penicillin: Drug, Rash  	cephalosporins: Drug Category, Rash    Home Medications:   * Patient Currently Takes Medications as of 11-Sep-2020 16:03 documented in Structured Notes  · 	rosuvastatin 10 mg oral tablet: 1 tab(s) orally once a day, at  NIGHT  · 	estradiol 0.1 mg/g vaginal cream: as prescribed, three times a week  · 	Vitamin D3 2000 intl units (50 mcg) oral tablet: AM  · 	Levoxyl 50 mcg (0.05 mg) oral tablet: 1 tab(s) orally once a day, AM    PMH/PSH/FH/SH:    Past Medical, Past Surgical, and Family History:  PAST MEDICAL HISTORY:  HLD (hyperlipidemia)     Hypothyroid     Ocular migraine     Osteopenia.     PAST SURGICAL HISTORY:  H/O ovarian cystectomy 1980s    History of strabismus surgery right eye; age 8    History of tonsillectomy at age 5    S/P cystoscopy ureteroscopy;  06/2020.     FAMILY HISTORY:  Family history of essential hypertension, Mother and Father  FH: CAD (coronary artery disease), Father.     Social History:  · Marital Status	  · Occupation	Physician  · Lives With	children; spouse     Substance Use History:  · Substance Use	no recreational drugs     Alcohol Use History:  · Have you ever consumed alcohol	yes...  · Alcohol Type	wine  · Alcohol Frequency	2-3 times/wk  · Alcohol Amount	1-2 drinks  · Problems Related to Alcohol Use	no  · 1. Have you felt you ought to CUT down on your drinking?	no  · 2. Have people ANNOYED you by criticizing your drinking?	no  · 3. Have you ever felt bad or GUILTY about your drinking?	no  · 4. Have you ever needed an "EYE OPENER", a drink first thing in the morning to steady your nerves or get rid of a hangover?	no     Tobacco Usage:  · Tobacco Usage: Never smoker    ROS negative unless otherwise noted in HPI      I&O's Summary    21 Sep 2020 07:01  -  22 Sep 2020 07:00  --------------------------------------------------------  IN: 500 mL / OUT: 575 mL / NET: -75 mL    22 Sep 2020 07:01  -  22 Sep 2020 13:33  --------------------------------------------------------  IN: 0 mL / OUT: 600 mL / NET: -600 mL        PHYSICAL EXAM:  Vital Signs Last 24 Hrs  T(C): 37.2 (22 Sep 2020 12:31), Max: 37.2 (22 Sep 2020 04:00)  T(F): 98.9 (22 Sep 2020 12:31), Max: 98.9 (22 Sep 2020 04:00)  HR: 79 (22 Sep 2020 12:31) (66 - 86)  BP: 103/42 (22 Sep 2020 12:31) (92/47 - 115/43)  BP(mean): 61 (21 Sep 2020 21:15) (55 - 71)  RR: 18 (22 Sep 2020 12:31) (11 - 21)  SpO2: 100% (22 Sep 2020 12:31) (84% - 100%)  CONSTITUTIONAL: NAD, well-developed, well-groomed  EYES: PERRLA; conjunctiva and sclera clear  ENMT: Moist oral mucosa, no pharyngeal injection or exudates; normal dentition  NECK: Supple, no palpable masses; no thyromegaly  RESPIRATORY: Normal respiratory effort; lungs are clear to auscultation bilaterally  CARDIOVASCULAR: Regular rate and rhythm, normal S1 and S2, no murmur/rub/gallop; No lower extremity edema; Peripheral pulses are 2+ bilaterally  ABDOMEN: Nontender to palpation, normoactive bowel sounds, no rebound/guarding; No hepatosplenomegaly      LABS:                        9.8    7.39  )-----------( 164      ( 22 Sep 2020 07:49 )             30.8     09-22    141  |  105  |  10  ----------------------------<  117<H>  4.1   |  25  |  0.65    Ca    8.5      22 Sep 2020 07:49  Phos  2.9     09-22  Mg     1.4     09-22

## 2020-09-23 ENCOUNTER — TRANSCRIPTION ENCOUNTER (OUTPATIENT)
Age: 65
End: 2020-09-23

## 2020-09-23 VITALS
RESPIRATION RATE: 17 BRPM | DIASTOLIC BLOOD PRESSURE: 40 MMHG | HEART RATE: 62 BPM | TEMPERATURE: 99 F | OXYGEN SATURATION: 99 % | SYSTOLIC BLOOD PRESSURE: 116 MMHG

## 2020-09-23 RX ORDER — OXYCODONE HYDROCHLORIDE 5 MG/1
1 TABLET ORAL
Qty: 10 | Refills: 0
Start: 2020-09-23

## 2020-09-23 RX ADMIN — Medication 600 MILLIGRAM(S): at 00:00

## 2020-09-23 RX ADMIN — Medication 600 MILLIGRAM(S): at 06:06

## 2020-09-23 RX ADMIN — SODIUM CHLORIDE 3 MILLILITER(S): 9 INJECTION INTRAMUSCULAR; INTRAVENOUS; SUBCUTANEOUS at 05:57

## 2020-09-23 RX ADMIN — Medication 975 MILLIGRAM(S): at 06:06

## 2020-09-23 RX ADMIN — Medication 975 MILLIGRAM(S): at 00:00

## 2020-09-23 NOTE — DISCHARGE NOTE PROVIDER - CARE PROVIDER_API CALL
Gloria Dillard  OBS-GYN - GENERAL  [address] [place] [address]  Phone: [phone]  Fax: [phone]  Follow Up Time:

## 2020-09-23 NOTE — DISCHARGE NOTE NURSING/CASE MANAGEMENT/SOCIAL WORK - PATIENT PORTAL LINK FT
You can access the FollowMyHealth Patient Portal offered by [facility] by registering at the following website: [email or web address]. By joining [facility]’s FollowMyHealth portal, you will also be able to view your health information using other applications (apps) compatible with our system.

## 2020-09-23 NOTE — PROGRESS NOTE ADULT - PROBLEM SELECTOR PLAN 1
Neuro: c/w po pain meds  CV: Hemodynamically stable  Pulm: Saturating well on room air, encourage incentive spirometry  GI: c/w regular diet  : voiding spontaneously  Heme: c/w HSQ and SCDs for DVT ppx  Dispo: Continue routine post-op care, pt to be d/c today    Argenis Fernandez,PGY1

## 2020-09-23 NOTE — DISCHARGE NOTE PROVIDER - NSDCFUADDINST_GEN_ALL_CORE_FT
Return to your regular way of eating.  Resume normal activity as tolerated. Complete vaginal rest, no tampons, no douching, no tub bathing, no sexual activities until cleared by MD.  Call your doctor with any signs and symptoms of infection such as fever, chills, nausea or vomiting. Call your doctor if you're unable to tolerate food or have difficulty urinating.  Call your doctor if you have pain that is not relieved by your prescribed medications.  Notify your doctor with any other concerns.  Follow up with Dr. Dillard in 2 weeks.

## 2020-09-23 NOTE — DISCHARGE NOTE PROVIDER - NSDCMRMEDTOKEN_GEN_ALL_CORE_FT
estradiol 0.1 mg/g vaginal cream: as prescribed, three times a week  Levoxyl 50 mcg (0.05 mg) oral tablet: 1 tab(s) orally once a day, AM  oxyCODONE 5 mg oral tablet: 1 tab(s) orally every 6 hours MDD:4 tabs  rosuvastatin 10 mg oral tablet: 1 tab(s) orally once a day, at  NIGHT  Vitamin D3 2000 intl units (50 mcg) oral tablet: AM

## 2020-09-23 NOTE — DISCHARGE NOTE PROVIDER - NSDCCPCAREPLAN_GEN_ALL_CORE_FT
PRINCIPAL DISCHARGE DIAGNOSIS  Diagnosis: S/P hysterectomy with oophorectomy  Assessment and Plan of Treatment: S/P hysterectomy with oophorectomy

## 2020-09-23 NOTE — PROGRESS NOTE ADULT - SUBJECTIVE AND OBJECTIVE BOX
Patient seen and examined at bedside, no acute overnight events. Pt c/o diarrhea overnight 2/2 senna. Pain well controlled. Patient is ambulating, passing flatus, voiding spontaneously, and tolerating regular diet. Denies CP, SOB, N/V, fevers, and chills.    Vital Signs Last 24 Hours  T(C): 36.9 (09-23-20 @ 06:03), Max: 37.2 (09-22-20 @ 12:31)  HR: 68 (09-23-20 @ 06:03) (68 - 80)  BP: 117/48 (09-23-20 @ 06:03) (103/42 - 117/48)  RR: 18 (09-23-20 @ 06:03) (18 - 19)  SpO2: 98% (09-23-20 @ 06:03) (98% - 100%)    I&O's Detail    22 Sep 2020 07:01  -  23 Sep 2020 07:00  --------------------------------------------------------  IN:  Total IN: 0 mL    OUT:    Indwelling Catheter - Urethral (mL): 600 mL    Voided (mL): 450 mL  Total OUT: 1050 mL    Total NET: -1050 mL    Physical Exam:  General: NAD  CV: NR, RR, S1, S2, no M/R/G  Lungs: CTA-B  Abdomen: Soft, non-tender, non-distended, +BS  Incision: CDI w/ steristrips in place  Ext: No pain or swelling    Labs:             9.8<L>  7.39  )-----------( 164      ( 09-22 @ 07:49 )             30.8<L>        MEDICATIONS  (STANDING):  acetaminophen   Tablet .. 975 milliGRAM(s) Oral every 6 hours  enoxaparin Injectable 40 milliGRAM(s) SubCutaneous daily  ibuprofen  Tablet. 600 milliGRAM(s) Oral every 6 hours  influenza   Vaccine 0.5 milliLiter(s) IntraMuscular once  senna 2 Tablet(s) Oral at bedtime  simethicone 80 milliGRAM(s) Chew daily  sodium chloride 0.9% lock flush 3 milliLiter(s) IV Push every 8 hours    MEDICATIONS  (PRN):  dexAMETHasone  Injectable 6 milliGRAM(s) IV Push once PRN nausea/vomiting  HYDROmorphone  Injectable 0.25 milliGRAM(s) IV Push every 3 hours PRN Severe breakthrough Pain (7 - 10)  naloxone Injectable 0.1 milliGRAM(s) IV Push every 3 minutes PRN For ANY of the following changes in patient status:  A. RR LESS THAN 10 breaths per minute, B. Oxygen saturation LESS THAN 90%, C. Sedation score of 6  ondansetron Injectable 4 milliGRAM(s) IV Push every 6 hours PRN Nausea  oxyCODONE    IR 5 milliGRAM(s) Oral every 3 hours PRN Severe Pain (7 - 10)

## 2020-09-23 NOTE — DISCHARGE NOTE PROVIDER - HOSPITAL COURSE
64yo s/p Total Abdominal Hysterectomy, BSO. . POD 1 Hct 43.5->30.8. At time of discharge, patient was voiding, ambulating, tolerating PO. Patient to have close follow up with Dr. Dillard.

## 2020-10-06 ENCOUNTER — RX RENEWAL (OUTPATIENT)
Age: 65
End: 2020-10-06

## 2020-10-08 LAB — SURGICAL PATHOLOGY STUDY: SIGNIFICANT CHANGE UP

## 2020-11-02 ENCOUNTER — RX RENEWAL (OUTPATIENT)
Age: 65
End: 2020-11-02

## 2020-11-21 LAB — SARS-COV-2 N GENE NPH QL NAA+PROBE: NOT DETECTED

## 2021-04-21 ENCOUNTER — RX RENEWAL (OUTPATIENT)
Age: 66
End: 2021-04-21

## 2021-05-11 ENCOUNTER — APPOINTMENT (OUTPATIENT)
Dept: INTERNAL MEDICINE | Facility: CLINIC | Age: 66
End: 2021-05-11
Payer: COMMERCIAL

## 2021-05-11 PROBLEM — G43.109 MIGRAINE WITH AURA, NOT INTRACTABLE, WITHOUT STATUS MIGRAINOSUS: Chronic | Status: ACTIVE | Noted: 2020-09-11

## 2021-05-11 PROCEDURE — 99072 ADDL SUPL MATRL&STAF TM PHE: CPT

## 2021-05-11 PROCEDURE — 86580 TB INTRADERMAL TEST: CPT

## 2021-09-20 ENCOUNTER — RX RENEWAL (OUTPATIENT)
Age: 66
End: 2021-09-20

## 2021-10-06 ENCOUNTER — APPOINTMENT (OUTPATIENT)
Dept: INTERNAL MEDICINE | Facility: CLINIC | Age: 66
End: 2021-10-06
Payer: COMMERCIAL

## 2021-10-06 VITALS
BODY MASS INDEX: 19.24 KG/M2 | WEIGHT: 98 LBS | TEMPERATURE: 98.4 F | HEIGHT: 60 IN | HEART RATE: 68 BPM | OXYGEN SATURATION: 98 % | SYSTOLIC BLOOD PRESSURE: 115 MMHG | DIASTOLIC BLOOD PRESSURE: 70 MMHG

## 2021-10-06 DIAGNOSIS — Z12.11 ENCOUNTER FOR SCREENING FOR MALIGNANT NEOPLASM OF COLON: ICD-10-CM

## 2021-10-06 DIAGNOSIS — Z86.018 PERSONAL HISTORY OF OTHER BENIGN NEOPLASM: ICD-10-CM

## 2021-10-06 PROCEDURE — 99397 PER PM REEVAL EST PAT 65+ YR: CPT | Mod: 25

## 2021-10-06 PROCEDURE — 90662 IIV NO PRSV INCREASED AG IM: CPT

## 2021-10-06 PROCEDURE — G0008: CPT

## 2021-10-06 PROCEDURE — 36415 COLL VENOUS BLD VENIPUNCTURE: CPT

## 2021-10-06 NOTE — ASSESSMENT
[FreeTextEntry1] : 67 y/o female for CPE\par \par Lipids: on atorva, check today\par \par Hypothyroid: labs today\par \par Liver hemangioma, GB polyps, repeat sono\par \par OP:  due for DEXA, hx of 2 years of BiPhos, stopped due to GI side effects\par \par No juany hematuria, check UA with micro today\par \par HCM:\par Flu SHot today\par COvid vaccines completed, 3rd dose scheduled\par CRC Screenin, Santosh, repeated in , normal, will call for results\par Mammography/sono Due Dec 2021\par Pneumovax completed\par Shingrix TBD\par

## 2021-10-06 NOTE — HISTORY OF PRESENT ILLNESS
[de-identified] : 66 y/p female here for CPE\par \par Last seen in  prior to hysterectomy.\par \par Hx of Hypothyroidism and hyperlipidemia, on levothyroxine and rosuvasatin 10mg\par \par Had narrow angle, s/p preventive iridotomy.\par \par Has had vaginal dryness, switched to estrogen tabs, following with N Smyzer\par \par HCM:\par Flu SHot today\par COvid vaccines completed, 3rd dose scheduled\par CRC Screenin, Santosh, repeated in , normal\par Mammography Due Dec 2021\par Pneumovax completed\par Shingrix TBD\par DEXA: , osteoporosis, s/p fosamax for 2 years, stopped for esophagus.

## 2021-10-07 LAB
25(OH)D3 SERPL-MCNC: 42 NG/ML
ALBUMIN SERPL ELPH-MCNC: 4.8 G/DL
ALP BLD-CCNC: 59 U/L
ALT SERPL-CCNC: 22 U/L
ANION GAP SERPL CALC-SCNC: 14 MMOL/L
APPEARANCE: CLEAR
AST SERPL-CCNC: 22 U/L
BACTERIA: NEGATIVE
BASOPHILS # BLD AUTO: 0.03 K/UL
BASOPHILS NFR BLD AUTO: 0.5 %
BILIRUB SERPL-MCNC: 0.3 MG/DL
BILIRUBIN URINE: NEGATIVE
BLOOD URINE: NORMAL
BUN SERPL-MCNC: 20 MG/DL
CALCIUM SERPL-MCNC: 9.8 MG/DL
CHLORIDE SERPL-SCNC: 106 MMOL/L
CHOLEST SERPL-MCNC: 163 MG/DL
CO2 SERPL-SCNC: 23 MMOL/L
COLOR: NORMAL
CREAT SERPL-MCNC: 0.76 MG/DL
EOSINOPHIL # BLD AUTO: 0.09 K/UL
EOSINOPHIL NFR BLD AUTO: 1.5 %
ESTIMATED AVERAGE GLUCOSE: 114 MG/DL
GLUCOSE QUALITATIVE U: NEGATIVE
GLUCOSE SERPL-MCNC: 97 MG/DL
HBA1C MFR BLD HPLC: 5.6 %
HCT VFR BLD CALC: 37.7 %
HDLC SERPL-MCNC: 84 MG/DL
HGB BLD-MCNC: 12.3 G/DL
HYALINE CASTS: 0 /LPF
IMM GRANULOCYTES NFR BLD AUTO: 0.2 %
KETONES URINE: NEGATIVE
LDLC SERPL CALC-MCNC: 72 MG/DL
LEUKOCYTE ESTERASE URINE: NEGATIVE
LYMPHOCYTES # BLD AUTO: 1.36 K/UL
LYMPHOCYTES NFR BLD AUTO: 23.2 %
MAN DIFF?: NORMAL
MCHC RBC-ENTMCNC: 29.4 PG
MCHC RBC-ENTMCNC: 32.6 GM/DL
MCV RBC AUTO: 90 FL
MICROSCOPIC-UA: NORMAL
MONOCYTES # BLD AUTO: 0.3 K/UL
MONOCYTES NFR BLD AUTO: 5.1 %
NEUTROPHILS # BLD AUTO: 4.07 K/UL
NEUTROPHILS NFR BLD AUTO: 69.5 %
NITRITE URINE: NEGATIVE
NONHDLC SERPL-MCNC: 79 MG/DL
PH URINE: 5.5
PLATELET # BLD AUTO: 223 K/UL
POTASSIUM SERPL-SCNC: 5.2 MMOL/L
PROT SERPL-MCNC: 6.8 G/DL
PROTEIN URINE: NEGATIVE
RBC # BLD: 4.19 M/UL
RBC # FLD: 13 %
RED BLOOD CELLS URINE: 1 /HPF
SODIUM SERPL-SCNC: 142 MMOL/L
SPECIFIC GRAVITY URINE: 1.02
SQUAMOUS EPITHELIAL CELLS: 1 /HPF
TRIGL SERPL-MCNC: 35 MG/DL
TSH SERPL-ACNC: 1.98 UIU/ML
UROBILINOGEN URINE: NORMAL
WBC # FLD AUTO: 5.86 K/UL
WHITE BLOOD CELLS URINE: 1 /HPF

## 2021-10-26 NOTE — ED ADULT NURSE NOTE - CHPI ED NUR SYMPTOMS NEG
no vomiting/no tingling/no fever/no confusion/no numbness Detail Level: Zone Render In Strict Bullet Format?: No Continue Regimen: Moisturizer

## 2021-12-03 ENCOUNTER — APPOINTMENT (OUTPATIENT)
Dept: INTERNAL MEDICINE | Facility: CLINIC | Age: 66
End: 2021-12-03
Payer: COMMERCIAL

## 2021-12-03 PROCEDURE — 90471 IMMUNIZATION ADMIN: CPT

## 2021-12-03 PROCEDURE — 90750 HZV VACC RECOMBINANT IM: CPT

## 2021-12-08 ENCOUNTER — APPOINTMENT (OUTPATIENT)
Dept: MRI IMAGING | Facility: CLINIC | Age: 66
End: 2021-12-08
Payer: COMMERCIAL

## 2021-12-08 ENCOUNTER — OUTPATIENT (OUTPATIENT)
Dept: OUTPATIENT SERVICES | Facility: HOSPITAL | Age: 66
LOS: 1 days | End: 2021-12-08
Payer: COMMERCIAL

## 2021-12-08 DIAGNOSIS — R10.2 PELVIC AND PERINEAL PAIN: ICD-10-CM

## 2021-12-08 DIAGNOSIS — Z00.8 ENCOUNTER FOR OTHER GENERAL EXAMINATION: ICD-10-CM

## 2021-12-08 DIAGNOSIS — Z98.890 OTHER SPECIFIED POSTPROCEDURAL STATES: Chronic | ICD-10-CM

## 2021-12-08 DIAGNOSIS — Z90.89 ACQUIRED ABSENCE OF OTHER ORGANS: Chronic | ICD-10-CM

## 2021-12-08 PROCEDURE — A9585: CPT

## 2021-12-08 PROCEDURE — 72197 MRI PELVIS W/O & W/DYE: CPT | Mod: 26

## 2021-12-08 PROCEDURE — 72197 MRI PELVIS W/O & W/DYE: CPT

## 2021-12-16 LAB — SARS-COV-2 N GENE NPH QL NAA+PROBE: NOT DETECTED

## 2022-01-03 ENCOUNTER — NON-APPOINTMENT (OUTPATIENT)
Age: 67
End: 2022-01-03

## 2022-01-04 ENCOUNTER — NON-APPOINTMENT (OUTPATIENT)
Age: 67
End: 2022-01-04

## 2022-01-06 LAB — SARS-COV-2 N GENE NPH QL NAA+PROBE: NOT DETECTED

## 2022-01-07 ENCOUNTER — APPOINTMENT (OUTPATIENT)
Dept: RADIOLOGY | Facility: CLINIC | Age: 67
End: 2022-01-07
Payer: COMMERCIAL

## 2022-01-07 ENCOUNTER — RESULT REVIEW (OUTPATIENT)
Age: 67
End: 2022-01-07

## 2022-01-07 ENCOUNTER — APPOINTMENT (OUTPATIENT)
Dept: ULTRASOUND IMAGING | Facility: CLINIC | Age: 67
End: 2022-01-07
Payer: COMMERCIAL

## 2022-01-07 ENCOUNTER — APPOINTMENT (OUTPATIENT)
Dept: MAMMOGRAPHY | Facility: CLINIC | Age: 67
End: 2022-01-07
Payer: COMMERCIAL

## 2022-01-07 PROCEDURE — 76641 ULTRASOUND BREAST COMPLETE: CPT | Mod: 50

## 2022-01-07 PROCEDURE — 76700 US EXAM ABDOM COMPLETE: CPT

## 2022-01-07 PROCEDURE — 77067 SCR MAMMO BI INCL CAD: CPT

## 2022-01-07 PROCEDURE — 77085 DXA BONE DENSITY AXL VRT FX: CPT

## 2022-01-26 ENCOUNTER — RX RENEWAL (OUTPATIENT)
Age: 67
End: 2022-01-26

## 2022-02-22 ENCOUNTER — RX RENEWAL (OUTPATIENT)
Age: 67
End: 2022-02-22

## 2022-03-19 ENCOUNTER — NON-APPOINTMENT (OUTPATIENT)
Age: 67
End: 2022-03-19

## 2022-03-20 LAB
APPEARANCE: ABNORMAL
BACTERIA: NEGATIVE
BILIRUBIN URINE: NEGATIVE
BLOOD URINE: NEGATIVE
CALCIUM OXALATE CRYSTALS: ABNORMAL
COLOR: NORMAL
GLUCOSE QUALITATIVE U: NEGATIVE
KETONES URINE: NEGATIVE
LEUKOCYTE ESTERASE URINE: NEGATIVE
MICROSCOPIC-UA: NORMAL
NITRITE URINE: NEGATIVE
PH URINE: 6
PROTEIN URINE: NEGATIVE
RED BLOOD CELLS URINE: 2 /HPF
SPECIFIC GRAVITY URINE: >=1.03
SQUAMOUS EPITHELIAL CELLS: 2 /HPF
UROBILINOGEN URINE: NORMAL
WHITE BLOOD CELLS URINE: 0 /HPF

## 2022-03-21 LAB
BACTERIA UR CULT: NORMAL
URINE CYTOLOGY: NORMAL

## 2022-04-08 ENCOUNTER — NON-APPOINTMENT (OUTPATIENT)
Age: 67
End: 2022-04-08

## 2022-04-08 ENCOUNTER — APPOINTMENT (OUTPATIENT)
Dept: UROLOGY | Facility: CLINIC | Age: 67
End: 2022-04-08
Payer: COMMERCIAL

## 2022-04-08 VITALS
BODY MASS INDEX: 19.83 KG/M2 | HEIGHT: 60 IN | HEART RATE: 60 BPM | SYSTOLIC BLOOD PRESSURE: 93 MMHG | RESPIRATION RATE: 16 BRPM | DIASTOLIC BLOOD PRESSURE: 59 MMHG | WEIGHT: 101 LBS

## 2022-04-08 PROCEDURE — 99213 OFFICE O/P EST LOW 20 MIN: CPT

## 2022-04-08 NOTE — ASSESSMENT
[FreeTextEntry1] : Patient is a 65 yo F who presents for chronic pelvic pain - unclear etiology - mainly at nighttime during sleep.\par \par She has seen GYN and undergone treatment for atrophic vaginitis, BV, and yeast infection - without any improvement or change in her pain.\par She previously has undergone cysto/URS/CTU in 6/2020.  She also had more recent imaging with abd ULT and pelvic MRI without obvious etiology.\par Pt is quite bothered by these symptoms.\par Will check GC.\par PVR today is 4cc.\par D/w pt that would refer to Dr Castaneda/Zari SAUNDERS who are specialists in chronic pelvic pain.\par

## 2022-04-08 NOTE — PHYSICAL EXAM
[General Appearance - Well Developed] : well developed [General Appearance - Well Nourished] : well nourished [Normal Appearance] : normal appearance [Well Groomed] : well groomed [General Appearance - In No Acute Distress] : no acute distress [Abdomen Soft] : soft [Abdomen Tenderness] : non-tender [Abdomen Hernia] : no hernia was discovered [Urinary Bladder Findings] : the bladder was normal on palpation

## 2022-04-08 NOTE — HISTORY OF PRESENT ILLNESS
[FreeTextEntry1] : Patient is a 67 yo F who presents for pelvic pain, feeling of urinary urgency.\par \par Symptoms occur in the middle of night, wakes her up in the middle of night.  Essentially only from 2-6am.  As soon as she stands to get out of bed her symptoms seems to resolve and there is no dysuria.  However when she lies back down she feels a severe pain/burning sensation in her pelvic area.  There is no dysuria or hematuria.  No significant voiding complaints.\par \par Symptoms ongoing for 6 months.  Similar episode years ago and at that time took abx and resolved and also started on vaginal estrogen previously for atrophic vaginitis\par \par Saw her GYN, treated for BV, yeast infection.  No change in symptoms.\par Saw another GYN Dr Javier Bergman - apparently found yeast infection again and took brexafem?\par \par Of note, she stopped for vaginal estrogen last year post RYAN/BSO 9/2020.  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  S/p negative gross hematuria work up with CTU and cysto/URS.

## 2022-04-09 ENCOUNTER — NON-APPOINTMENT (OUTPATIENT)
Age: 67
End: 2022-04-09

## 2022-04-11 ENCOUNTER — NON-APPOINTMENT (OUTPATIENT)
Age: 67
End: 2022-04-11

## 2022-04-12 ENCOUNTER — NON-APPOINTMENT (OUTPATIENT)
Age: 67
End: 2022-04-12

## 2022-04-13 ENCOUNTER — NON-APPOINTMENT (OUTPATIENT)
Age: 67
End: 2022-04-13

## 2022-04-20 ENCOUNTER — APPOINTMENT (OUTPATIENT)
Dept: UROLOGY | Facility: CLINIC | Age: 67
End: 2022-04-20
Payer: COMMERCIAL

## 2022-04-20 PROCEDURE — 99215 OFFICE O/P EST HI 40 MIN: CPT

## 2022-05-13 ENCOUNTER — APPOINTMENT (OUTPATIENT)
Dept: INTERNAL MEDICINE | Facility: CLINIC | Age: 67
End: 2022-05-13
Payer: COMMERCIAL

## 2022-05-13 PROCEDURE — 90471 IMMUNIZATION ADMIN: CPT

## 2022-05-13 PROCEDURE — 90750 HZV VACC RECOMBINANT IM: CPT

## 2022-06-08 ENCOUNTER — NON-APPOINTMENT (OUTPATIENT)
Age: 67
End: 2022-06-08

## 2022-06-10 LAB — SARS-COV-2 N GENE NPH QL NAA+PROBE: NOT DETECTED

## 2022-06-15 ENCOUNTER — APPOINTMENT (OUTPATIENT)
Dept: UROLOGY | Facility: CLINIC | Age: 67
End: 2022-06-15
Payer: COMMERCIAL

## 2022-06-15 PROCEDURE — 99213 OFFICE O/P EST LOW 20 MIN: CPT

## 2022-06-29 ENCOUNTER — RESULT REVIEW (OUTPATIENT)
Age: 67
End: 2022-06-29

## 2022-06-29 DIAGNOSIS — R92.8 OTHER ABNORMAL AND INCONCLUSIVE FINDINGS ON DIAGNOSTIC IMAGING OF BREAST: ICD-10-CM

## 2022-07-15 ENCOUNTER — RESULT REVIEW (OUTPATIENT)
Age: 67
End: 2022-07-15

## 2022-07-15 ENCOUNTER — APPOINTMENT (OUTPATIENT)
Dept: ULTRASOUND IMAGING | Facility: CLINIC | Age: 67
End: 2022-07-15

## 2022-07-15 PROCEDURE — 76642 ULTRASOUND BREAST LIMITED: CPT | Mod: LT

## 2022-07-22 ENCOUNTER — NON-APPOINTMENT (OUTPATIENT)
Age: 67
End: 2022-07-22

## 2022-07-23 LAB — SARS-COV-2 N GENE NPH QL NAA+PROBE: NOT DETECTED

## 2022-07-27 ENCOUNTER — NON-APPOINTMENT (OUTPATIENT)
Age: 67
End: 2022-07-27

## 2022-07-29 LAB — SARS-COV-2 N GENE NPH QL NAA+PROBE: NOT DETECTED

## 2022-08-04 ENCOUNTER — NON-APPOINTMENT (OUTPATIENT)
Age: 67
End: 2022-08-04

## 2022-08-05 ENCOUNTER — NON-APPOINTMENT (OUTPATIENT)
Age: 67
End: 2022-08-05

## 2022-08-06 LAB — SARS-COV-2 N GENE NPH QL NAA+PROBE: NOT DETECTED

## 2022-10-06 ENCOUNTER — APPOINTMENT (OUTPATIENT)
Dept: INTERNAL MEDICINE | Facility: CLINIC | Age: 67
End: 2022-10-06

## 2022-10-06 PROCEDURE — 90662 IIV NO PRSV INCREASED AG IM: CPT

## 2022-10-06 PROCEDURE — G0008: CPT

## 2022-10-18 ENCOUNTER — NON-APPOINTMENT (OUTPATIENT)
Age: 67
End: 2022-10-18

## 2022-10-19 ENCOUNTER — APPOINTMENT (OUTPATIENT)
Dept: INTERNAL MEDICINE | Facility: CLINIC | Age: 67
End: 2022-10-19

## 2022-10-19 VITALS
WEIGHT: 96 LBS | HEIGHT: 60 IN | DIASTOLIC BLOOD PRESSURE: 75 MMHG | SYSTOLIC BLOOD PRESSURE: 119 MMHG | BODY MASS INDEX: 18.85 KG/M2 | TEMPERATURE: 98.1 F | HEART RATE: 71 BPM | OXYGEN SATURATION: 98 %

## 2022-10-19 DIAGNOSIS — K82.4 CHOLESTEROLOSIS OF GALLBLADDER: ICD-10-CM

## 2022-10-19 PROCEDURE — 36415 COLL VENOUS BLD VENIPUNCTURE: CPT

## 2022-10-19 PROCEDURE — 99397 PER PM REEVAL EST PAT 65+ YR: CPT | Mod: 25

## 2022-10-19 NOTE — ASSESSMENT
[FreeTextEntry1] : 68 y/o female for CPE\par \par Lipids: on rosuvastatin 10, check labs today\par \par Hypothyroid: on levothyrox 50  labs today\par \par Liver hemangioma, GB polyps, sono stable, repeat yearly, ordered\par \par OP:  DEXA Oct 2021, t Score -2.9, declined treatment\par check VItamin D\par Repeat DEXA in \par \par \par HCM:\par Flu shot done\par COVID bivalent booster  completed\par CRC Screenin, Santosh, repeated in 2019 - do not have results on file, reports normal\par Mammography/sono  2022, repeat ordered\par Pneumovax completed, needs Prevnar 20 in the future\par Shingrix completed\par

## 2022-10-19 NOTE — HISTORY OF PRESENT ILLNESS
[de-identified] : 66 y/o female for CPE\par \par Reviewed recent notes\par Saw Dr. Castaneda for ongoing pelvic pain, last visit in June, treated with diazepam suppositories with good effect as per notes\par Has f/u with Tonya in Dec 2022\par \par Did have a syncopal episode the day after her COVID booster after showering, had prodrome, no injury, no events since\par Travelling to [place] tomorrow\par Daughter engaged!

## 2022-10-20 LAB
25(OH)D3 SERPL-MCNC: 46.6 NG/ML
ALBUMIN SERPL ELPH-MCNC: 4.9 G/DL
ALP BLD-CCNC: 69 U/L
ALT SERPL-CCNC: 18 U/L
ANION GAP SERPL CALC-SCNC: 11 MMOL/L
AST SERPL-CCNC: 22 U/L
BASOPHILS # BLD AUTO: 0.04 K/UL
BASOPHILS NFR BLD AUTO: 0.5 %
BILIRUB SERPL-MCNC: 0.3 MG/DL
BUN SERPL-MCNC: 21 MG/DL
CALCIUM SERPL-MCNC: 10.1 MG/DL
CHLORIDE SERPL-SCNC: 103 MMOL/L
CHOLEST SERPL-MCNC: 176 MG/DL
CO2 SERPL-SCNC: 26 MMOL/L
CREAT SERPL-MCNC: 0.77 MG/DL
EGFR: 84 ML/MIN/1.73M2
EOSINOPHIL # BLD AUTO: 0.06 K/UL
EOSINOPHIL NFR BLD AUTO: 0.7 %
ESTIMATED AVERAGE GLUCOSE: 111 MG/DL
GLUCOSE SERPL-MCNC: 92 MG/DL
HBA1C MFR BLD HPLC: 5.5 %
HCT VFR BLD CALC: 38.4 %
HDLC SERPL-MCNC: 89 MG/DL
HGB BLD-MCNC: 12.7 G/DL
IMM GRANULOCYTES NFR BLD AUTO: 0.4 %
LDLC SERPL CALC-MCNC: 79 MG/DL
LYMPHOCYTES # BLD AUTO: 1.41 K/UL
LYMPHOCYTES NFR BLD AUTO: 16.5 %
MAN DIFF?: NORMAL
MCHC RBC-ENTMCNC: 28.9 PG
MCHC RBC-ENTMCNC: 33.1 GM/DL
MCV RBC AUTO: 87.3 FL
MONOCYTES # BLD AUTO: 0.38 K/UL
MONOCYTES NFR BLD AUTO: 4.4 %
NEUTROPHILS # BLD AUTO: 6.65 K/UL
NEUTROPHILS NFR BLD AUTO: 77.5 %
NONHDLC SERPL-MCNC: 87 MG/DL
PLATELET # BLD AUTO: 246 K/UL
POTASSIUM SERPL-SCNC: 4.7 MMOL/L
PROT SERPL-MCNC: 7.2 G/DL
RBC # BLD: 4.4 M/UL
RBC # FLD: 12.8 %
SODIUM SERPL-SCNC: 141 MMOL/L
TRIGL SERPL-MCNC: 40 MG/DL
TSH SERPL-ACNC: 1.23 UIU/ML
WBC # FLD AUTO: 8.57 K/UL

## 2022-11-21 ENCOUNTER — NON-APPOINTMENT (OUTPATIENT)
Age: 67
End: 2022-11-21

## 2022-11-22 LAB — SARS-COV-2 N GENE NPH QL NAA+PROBE: NOT DETECTED
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
## 2022-12-15 ENCOUNTER — NON-APPOINTMENT (OUTPATIENT)
Age: 67
End: 2022-12-15

## 2022-12-16 ENCOUNTER — NON-APPOINTMENT (OUTPATIENT)
Age: 67
End: 2022-12-16

## 2022-12-16 ENCOUNTER — APPOINTMENT (OUTPATIENT)
Dept: UROLOGY | Facility: CLINIC | Age: 67
End: 2022-12-16

## 2022-12-16 PROCEDURE — 99214 OFFICE O/P EST MOD 30 MIN: CPT

## 2022-12-17 LAB — SARS-COV-2 N GENE NPH QL NAA+PROBE: NOT DETECTED

## 2023-02-03 ENCOUNTER — RESULT REVIEW (OUTPATIENT)
Age: 68
End: 2023-02-03

## 2023-02-03 ENCOUNTER — APPOINTMENT (OUTPATIENT)
Dept: MAMMOGRAPHY | Facility: CLINIC | Age: 68
End: 2023-02-03
Payer: COMMERCIAL

## 2023-02-03 ENCOUNTER — APPOINTMENT (OUTPATIENT)
Dept: ULTRASOUND IMAGING | Facility: CLINIC | Age: 68
End: 2023-02-03
Payer: COMMERCIAL

## 2023-02-03 PROCEDURE — 76641 ULTRASOUND BREAST COMPLETE: CPT | Mod: 50

## 2023-02-03 PROCEDURE — 76700 US EXAM ABDOM COMPLETE: CPT

## 2023-02-03 PROCEDURE — 77067 SCR MAMMO BI INCL CAD: CPT

## 2023-02-03 PROCEDURE — 77063 BREAST TOMOSYNTHESIS BI: CPT

## 2023-03-10 ENCOUNTER — RX RENEWAL (OUTPATIENT)
Age: 68
End: 2023-03-10

## 2023-03-22 ENCOUNTER — RX RENEWAL (OUTPATIENT)
Age: 68
End: 2023-03-22

## 2023-04-21 ENCOUNTER — NON-APPOINTMENT (OUTPATIENT)
Age: 68
End: 2023-04-21

## 2023-05-01 ENCOUNTER — NON-APPOINTMENT (OUTPATIENT)
Age: 68
End: 2023-05-01

## 2023-05-11 ENCOUNTER — NON-APPOINTMENT (OUTPATIENT)
Age: 68
End: 2023-05-11

## 2023-05-23 ENCOUNTER — NON-APPOINTMENT (OUTPATIENT)
Age: 68
End: 2023-05-23

## 2023-05-24 ENCOUNTER — NON-APPOINTMENT (OUTPATIENT)
Age: 68
End: 2023-05-24

## 2023-05-24 ENCOUNTER — APPOINTMENT (OUTPATIENT)
Dept: ORTHOPEDIC SURGERY | Facility: CLINIC | Age: 68
End: 2023-05-24
Payer: COMMERCIAL

## 2023-05-24 VITALS
BODY MASS INDEX: 19.24 KG/M2 | WEIGHT: 98 LBS | DIASTOLIC BLOOD PRESSURE: 61 MMHG | HEIGHT: 60 IN | SYSTOLIC BLOOD PRESSURE: 104 MMHG | HEART RATE: 98 BPM

## 2023-05-24 PROCEDURE — 99203 OFFICE O/P NEW LOW 30 MIN: CPT

## 2023-05-24 PROCEDURE — 73030 X-RAY EXAM OF SHOULDER: CPT | Mod: RT

## 2023-06-02 ENCOUNTER — NON-APPOINTMENT (OUTPATIENT)
Age: 68
End: 2023-06-02

## 2023-06-28 ENCOUNTER — APPOINTMENT (OUTPATIENT)
Dept: ORTHOPEDIC SURGERY | Facility: CLINIC | Age: 68
End: 2023-06-28
Payer: COMMERCIAL

## 2023-06-28 VITALS — BODY MASS INDEX: 19.63 KG/M2 | HEIGHT: 60 IN | WEIGHT: 100 LBS

## 2023-06-28 PROCEDURE — 99214 OFFICE O/P EST MOD 30 MIN: CPT | Mod: 25

## 2023-06-28 PROCEDURE — 20611 DRAIN/INJ JOINT/BURSA W/US: CPT | Mod: RT

## 2023-06-28 NOTE — PHYSICAL EXAM
[de-identified] : Constitutional\par o Appearance : well-nourished, well developed, alert, in no acute distress \par Head and Face\par o Head :\par ¦ Inspection : atraumatic, normocephalic\par o Face :\par ¦ Inspection : no visible rash or discoloration\par Respiratory\par o Respiratory Effort: breathing unlabored \par Neurologic\par o Sensation : Normal sensation \par Psychiatric\par o Mood and Affect: mood normal, affect appropriate \par Lymphatic\par o Additional Nodes : No palpable lymph nodes present \par \par Cervical Spine\par o Inspection/Palpation :\par ¦ Inspection : alignment midline, normal degree of lordosis present\par ¦ Skin : normal appearance, no masses or tenderness, trachea midline\par ¦ Palpation : musculature is nontender to palpation\par o Range of Motion : arc of motion full in all planes, no crepitance or pain with ROM\par o Tests: Negative Spurling’s test \par \par Right Upper Extremity\par o Right Shoulder :\par ¦ Inspection/Palpation : no anterior capsular tenderness, no swelling or deformities\par ¦ Range of Motion : forward flexion to about 115° passively, ABD past 80° causes pain, IR limited by at least 4 vertebral levels, ER is mildly painful \par ¦ Strength : forward elevation 4+/5, IR 4+/5, ER 4+/5, ER at 90° of abduction 5/5, supraspinatus 4+/5, adduction 5/5, abduction 5/5, biceps/triceps 5/5,  5/5 \par ¦ Stability : no joint instability on provocative testing\par ¦ Tests: Menendez minimally positive, Neer test negative, Patty test negative, drop arm test negative, Ness's test negative \par \par Left Upper Extremity\par o Left Shoulder :\par ¦ Inspection/Palpation : no tenderness, no swelling or deformities\par ¦ Range of Motion : full and painless in all planes, no crepitance\par ¦ Strength : forward elevation 5/5, IR 5/5, ER 5/5, ER at 90° of abduction 5/5, supraspinatus 5/5, adduction 5/5, abduction 5/5, biceps/triceps 5/5,  5/5 \par ¦ Stability : no joint instability on provocative testing\par ¦ Tests: Menendez negative, Neer test negative, Patty test negative, drop arm test negative, Ness's test negative\par \par Gait and Station:\par Gait: gait normal, no significant extremity swelling or lymphedema, good proprioception and balance\par \par o Shoulder: Indication- shoulder arthritis, Anatomic location rigjt shoulder intra-articular joint, Spray-area was sterilized with Betadine and alcohol and anesthetized with Ethyl Chloride , Needle used - 22G, Medications given- 5cc's lidocaine, 0.5cc's kenalog, 0.5cc's dexamethasone. The patient tolerated the procedure well. This was done under Ultrasound guidance.

## 2023-06-28 NOTE — HISTORY OF PRESENT ILLNESS
[de-identified] : 68 year old LHD female presents complaining of right shoulder pain and restricted ROM x2-3 months. She notes a history of LEFT frozen shoulder 10 years ago, treated with PT and a cortisone shot. She had a bad experience with the cortisone shot and it only lasted 1 day. She notes she has been attending PT and notices improvement. She notes the motion is 40% better. She notes the pain wakes her up from her sleep. \par PMHx hypothyroidism, HLD. Keflex and PCN allergies (rash). \par \par Radiology Results 05/24/2023\par o Right Shoulder : AP internal/external rotation and outlet views were obtained and revealed a downsloping acromion, no significant degenerative arthritis of the GH joint, no lytic lesions of the visualized chest or proximal humerus.

## 2023-06-28 NOTE — DISCUSSION/SUMMARY
[de-identified] : I went over the pathophysiology of the patient's symptoms in great detail with the patient. I am recommending the patient continues to go to physical therapy to obtain increases in their strength and mobility. A prescription was provided. At-home strengthening exercises were discussed and demonstrated with the patient. The patient elected to receive a cortisone injection into her right shoulder today, and tolerated it well. I instructed the patient on ROM exercises, and told them to take it easy. The use of ice and rest was reviewed with the patient. The patient may resume activities tomorrow. We discussed the use of ice, Tylenol and anti-inflammatories to relieve pain. She should purchase an over-the-door pulley and begin a diligent at-home exercise program. \par \par All of their questions were answered. They understand and consent to the plan. \par \par FU in one month.

## 2023-06-28 NOTE — ADDENDUM
[FreeTextEntry1] : I, CORIEDEXTER KHAN, acted solely as a scribe for Dr. Brayan Gallego on this date 06/28/2023.\par \par All medical record entries made by the Scribe were at my, Dr. Brayan Gallego, direction and personally dictated by me on 06/28/2023. I have reviewed the chart and agree that the record accurately reflects my personal performance of the history, physical exam, assessment and plan. I have also personally directed, reviewed, and agreed with the chart.

## 2023-06-28 NOTE — CONSULT LETTER
[Dear  ___] : Dear  [unfilled], [Consult Letter:] : I had the pleasure of evaluating your patient, [unfilled]. [Please see my note below.] : Please see my note below. [Consult Closing:] : Thank you very much for allowing me to participate in the care of this patient.  If you have any questions, please do not hesitate to contact me. [Sincerely,] : Sincerely, [FreeTextEntry3] : Brayan Gallego M.D.

## 2023-08-01 ENCOUNTER — NON-APPOINTMENT (OUTPATIENT)
Age: 68
End: 2023-08-01

## 2023-08-02 ENCOUNTER — APPOINTMENT (OUTPATIENT)
Dept: ORTHOPEDIC SURGERY | Facility: CLINIC | Age: 68
End: 2023-08-02
Payer: COMMERCIAL

## 2023-08-02 PROCEDURE — 99213 OFFICE O/P EST LOW 20 MIN: CPT

## 2023-08-02 NOTE — HISTORY OF PRESENT ILLNESS
[de-identified] : 68-year-old LHD female presents for a follow-up of the right shoulder pain and restricted ROM x2-3 months. She notes a history of LEFT frozen shoulder 10 years ago. She had a right shoulder cortisone injection on 06/28/2023. She reports she slept through the night 4 to 5 times. She notes the pain is worse at night. she notes the mobility is 75% back. She is currently in formal PT and wants to continue attending. She notes she has been doing at home exercises. She has taken Advil to help relieve pain but notes no relief.  PMHx hypothyroidism, HLD. Keflex and PCN allergies (rash).   Radiology Results 05/24/2023 o Right Shoulder : AP internal/external rotation and outlet views were obtained and revealed a downsloping acromion, no significant degenerative arthritis of the GH joint, no lytic lesions of the visualized chest or proximal humerus.

## 2023-08-02 NOTE — DISCUSSION/SUMMARY
[de-identified] : I went over the pathophysiology of the patient's symptoms in great detail with the patient. At-home strengthening exercises were discussed and demonstrated with the patient. I am recommending the patient continues to go to physical therapy to obtain increases in their strength and mobility. A prescription was provided. We discussed the use of ice, Tylenol and anti-inflammatories to relieve pain.   All of their questions were answered. They understand and consent to the plan.   FU in 6 weeks

## 2023-09-09 ENCOUNTER — NON-APPOINTMENT (OUTPATIENT)
Age: 68
End: 2023-09-09

## 2023-09-13 ENCOUNTER — APPOINTMENT (OUTPATIENT)
Dept: ORTHOPEDIC SURGERY | Facility: CLINIC | Age: 68
End: 2023-09-13
Payer: COMMERCIAL

## 2023-09-13 PROCEDURE — 99213 OFFICE O/P EST LOW 20 MIN: CPT

## 2023-09-26 ENCOUNTER — APPOINTMENT (OUTPATIENT)
Dept: INTERNAL MEDICINE | Facility: CLINIC | Age: 68
End: 2023-09-26
Payer: COMMERCIAL

## 2023-09-26 PROCEDURE — G0008: CPT

## 2023-09-26 PROCEDURE — 90662 IIV NO PRSV INCREASED AG IM: CPT

## 2023-10-13 ENCOUNTER — APPOINTMENT (OUTPATIENT)
Dept: UROLOGY | Facility: CLINIC | Age: 68
End: 2023-10-13
Payer: COMMERCIAL

## 2023-10-13 VITALS — HEART RATE: 67 BPM | DIASTOLIC BLOOD PRESSURE: 65 MMHG | SYSTOLIC BLOOD PRESSURE: 114 MMHG

## 2023-10-13 DIAGNOSIS — R31.9 HEMATURIA, UNSPECIFIED: ICD-10-CM

## 2023-10-13 DIAGNOSIS — M79.18 MYALGIA, OTHER SITE: ICD-10-CM

## 2023-10-13 PROCEDURE — 99214 OFFICE O/P EST MOD 30 MIN: CPT

## 2023-10-16 LAB
APPEARANCE: CLEAR
BACTERIA: NEGATIVE /HPF
BILIRUBIN URINE: NEGATIVE
BLOOD URINE: ABNORMAL
CAST: 1 /LPF
COLOR: YELLOW
EPITHELIAL CELLS: 1 /HPF
GLUCOSE QUALITATIVE U: NEGATIVE MG/DL
KETONES URINE: NEGATIVE MG/DL
LEUKOCYTE ESTERASE URINE: ABNORMAL
MICROSCOPIC-UA: NORMAL
NITRITE URINE: NEGATIVE
PH URINE: 5.5
PROTEIN URINE: NEGATIVE MG/DL
RED BLOOD CELLS URINE: 1 /HPF
REVIEW: NORMAL
SPECIFIC GRAVITY URINE: 1.02
UROBILINOGEN URINE: 0.2 MG/DL
WHITE BLOOD CELLS URINE: 0 /HPF

## 2023-10-17 ENCOUNTER — NON-APPOINTMENT (OUTPATIENT)
Age: 68
End: 2023-10-17

## 2023-10-17 LAB — URINE CYTOLOGY: NORMAL

## 2023-11-01 ENCOUNTER — APPOINTMENT (OUTPATIENT)
Dept: ORTHOPEDIC SURGERY | Facility: CLINIC | Age: 68
End: 2023-11-01
Payer: COMMERCIAL

## 2023-11-01 VITALS — BODY MASS INDEX: 19.04 KG/M2 | HEIGHT: 60 IN | WEIGHT: 97 LBS

## 2023-11-01 PROCEDURE — 99213 OFFICE O/P EST LOW 20 MIN: CPT

## 2023-11-07 DIAGNOSIS — Z11.1 ENCOUNTER FOR SCREENING FOR RESPIRATORY TUBERCULOSIS: ICD-10-CM

## 2023-11-07 DIAGNOSIS — Z01.818 ENCOUNTER FOR OTHER PREPROCEDURAL EXAMINATION: ICD-10-CM

## 2023-11-07 DIAGNOSIS — Z11.59 ENCOUNTER FOR SCREENING FOR OTHER VIRAL DISEASES: ICD-10-CM

## 2023-11-08 ENCOUNTER — APPOINTMENT (OUTPATIENT)
Dept: INTERNAL MEDICINE | Facility: CLINIC | Age: 68
End: 2023-11-08
Payer: COMMERCIAL

## 2023-11-08 VITALS
WEIGHT: 98 LBS | HEART RATE: 69 BPM | SYSTOLIC BLOOD PRESSURE: 120 MMHG | BODY MASS INDEX: 19.24 KG/M2 | HEIGHT: 60 IN | DIASTOLIC BLOOD PRESSURE: 64 MMHG | RESPIRATION RATE: 17 BRPM | OXYGEN SATURATION: 99 %

## 2023-11-08 DIAGNOSIS — Z23 ENCOUNTER FOR IMMUNIZATION: ICD-10-CM

## 2023-11-08 DIAGNOSIS — E78.00 PURE HYPERCHOLESTEROLEMIA, UNSPECIFIED: ICD-10-CM

## 2023-11-08 DIAGNOSIS — R10.2 PELVIC AND PERINEAL PAIN: ICD-10-CM

## 2023-11-08 DIAGNOSIS — E03.9 HYPOTHYROIDISM, UNSPECIFIED: ICD-10-CM

## 2023-11-08 DIAGNOSIS — G89.29 PELVIC AND PERINEAL PAIN: ICD-10-CM

## 2023-11-08 DIAGNOSIS — M81.0 AGE-RELATED OSTEOPOROSIS W/OUT CURRENT PATHOLOGICAL FRACTURE: ICD-10-CM

## 2023-11-08 DIAGNOSIS — R01.1 CARDIAC MURMUR, UNSPECIFIED: ICD-10-CM

## 2023-11-08 DIAGNOSIS — N95.2 POSTMENOPAUSAL ATROPHIC VAGINITIS: ICD-10-CM

## 2023-11-08 DIAGNOSIS — Z00.00 ENCOUNTER FOR GENERAL ADULT MEDICAL EXAMINATION W/OUT ABNORMAL FINDINGS: ICD-10-CM

## 2023-11-08 DIAGNOSIS — M75.01 ADHESIVE CAPSULITIS OF RIGHT SHOULDER: ICD-10-CM

## 2023-11-08 PROCEDURE — G0009: CPT

## 2023-11-08 PROCEDURE — 90677 PCV20 VACCINE IM: CPT

## 2023-11-08 PROCEDURE — 36415 COLL VENOUS BLD VENIPUNCTURE: CPT

## 2023-11-08 PROCEDURE — 99397 PER PM REEVAL EST PAT 65+ YR: CPT | Mod: 25

## 2023-11-08 RX ORDER — ESTRADIOL 0.1 MG/G
0.1 CREAM VAGINAL
Qty: 85 | Refills: 2 | Status: DISCONTINUED | COMMUNITY
Start: 2019-12-22 | End: 2023-11-08

## 2023-11-09 LAB
ALBUMIN SERPL ELPH-MCNC: 4.8 G/DL
ALP BLD-CCNC: 79 U/L
ALT SERPL-CCNC: 21 U/L
ANION GAP SERPL CALC-SCNC: 12 MMOL/L
AST SERPL-CCNC: 20 U/L
BILIRUB SERPL-MCNC: 0.3 MG/DL
BUN SERPL-MCNC: 20 MG/DL
CALCIUM SERPL-MCNC: 9.9 MG/DL
CHLORIDE SERPL-SCNC: 105 MMOL/L
CHOLEST SERPL-MCNC: 177 MG/DL
CO2 SERPL-SCNC: 25 MMOL/L
CREAT SERPL-MCNC: 0.7 MG/DL
EGFR: 94 ML/MIN/1.73M2
GLUCOSE SERPL-MCNC: 93 MG/DL
HDLC SERPL-MCNC: 94 MG/DL
LDLC SERPL CALC-MCNC: 74 MG/DL
NONHDLC SERPL-MCNC: 83 MG/DL
POTASSIUM SERPL-SCNC: 4.3 MMOL/L
PROT SERPL-MCNC: 7 G/DL
SODIUM SERPL-SCNC: 142 MMOL/L
TRIGL SERPL-MCNC: 37 MG/DL
TSH SERPL-ACNC: 1.31 UIU/ML

## 2023-11-10 ENCOUNTER — TRANSCRIPTION ENCOUNTER (OUTPATIENT)
Age: 68
End: 2023-11-10

## 2023-12-07 ENCOUNTER — NON-APPOINTMENT (OUTPATIENT)
Age: 68
End: 2023-12-07

## 2023-12-08 ENCOUNTER — APPOINTMENT (OUTPATIENT)
Dept: CARDIOLOGY | Facility: CLINIC | Age: 68
End: 2023-12-08
Payer: COMMERCIAL

## 2023-12-08 PROCEDURE — 93306 TTE W/DOPPLER COMPLETE: CPT

## 2023-12-11 ENCOUNTER — TRANSCRIPTION ENCOUNTER (OUTPATIENT)
Age: 68
End: 2023-12-11

## 2024-01-16 ENCOUNTER — TRANSCRIPTION ENCOUNTER (OUTPATIENT)
Age: 69
End: 2024-01-16

## 2024-02-09 ENCOUNTER — APPOINTMENT (OUTPATIENT)
Dept: MAMMOGRAPHY | Facility: CLINIC | Age: 69
End: 2024-02-09
Payer: COMMERCIAL

## 2024-02-09 ENCOUNTER — RESULT REVIEW (OUTPATIENT)
Age: 69
End: 2024-02-09

## 2024-02-09 ENCOUNTER — APPOINTMENT (OUTPATIENT)
Dept: ULTRASOUND IMAGING | Facility: CLINIC | Age: 69
End: 2024-02-09
Payer: COMMERCIAL

## 2024-02-09 PROCEDURE — 76641 ULTRASOUND BREAST COMPLETE: CPT | Mod: 50

## 2024-02-09 PROCEDURE — 77063 BREAST TOMOSYNTHESIS BI: CPT

## 2024-02-09 PROCEDURE — 77067 SCR MAMMO BI INCL CAD: CPT

## 2024-02-09 RX ORDER — LEVOTHYROXINE SODIUM 0.05 MG/1
50 TABLET ORAL
Qty: 90 | Refills: 3 | Status: ACTIVE | COMMUNITY
Start: 2021-04-21 | End: 1900-01-01

## 2024-02-10 ENCOUNTER — NON-APPOINTMENT (OUTPATIENT)
Age: 69
End: 2024-02-10

## 2024-04-15 ENCOUNTER — RX RENEWAL (OUTPATIENT)
Age: 69
End: 2024-04-15

## 2024-04-15 RX ORDER — ROSUVASTATIN CALCIUM 10 MG/1
10 TABLET, FILM COATED ORAL
Qty: 90 | Refills: 3 | Status: ACTIVE | COMMUNITY
Start: 2023-05-12 | End: 1900-01-01

## 2024-04-16 RX ORDER — DIAZEPAM 100 %
POWDER (GRAM) MISCELLANEOUS
Qty: 30 | Refills: 3 | Status: ACTIVE | COMMUNITY
Start: 2023-06-02 | End: 1900-01-01

## 2024-07-26 NOTE — ASU PATIENT PROFILE, ADULT - PRO ARRIVE FROM
Spinal Block    Date/Time: 7/26/2024 3:46 PM    Performed by: Maria Teresa Solorzano CRNA  Authorized by: Jamarcus Weber MD    Patient Location:  OR  Indication: primary anesthetic    Pre anesthesia checklist Patient Identified (2 criteria), Timeout Performed, Resuscitation equipment available, IV Bolus, Allergies confirmed, Monitors applied, Coagulation Status, Block site marked (if applicable), Sedation given (if needed), Block Plan Confirmed, Drugs/Solutions Labeled, IV Access functioning, Necessary Block Equipment Present, Consent Verified and Aseptic technique   Patient Position:  Sitting  Prep:  Chlorhexidine gluconate (CHG)  Max Sterile Barrier Technique:  Hand washing, cap/mask, sterile gloves and sterile drape  Monitoring:  Continuous pulse ox, EKG, NIBP, FHT and blood pressure  Oxygen Supplement:  Room Air  Approach:  Midline  Local Infiltration:  1% Lidocaine  Location:  L3-4  Injection Technique:  Single-shot  Needle Type:  Pencil-tip  Needle Gauge:  25 G  Needle Length:  3.5 in  Medications Administered  lidocaine (PF) 1 % - Subcutaneous   5 mL - 7/26/2024 3:46:00 PM  bupivacaine 0.75% in dextrose 8.25% - Intrathecal   1.8 mL - 7/26/2024 3:46:00 PM  fentaNYL (SUBLIMAZE) 50 mcg/mL - Intrathecal   15 mcg - 7/26/2024 3:46:00 PM  morphine (PF) 1 mg/mL - Intrathecal   0.2 mg - 7/26/2024 3:46:00 PM    Assessment:    Block Outcome: pain improved  Number of Attempts: 1   Procedure Assessment: patient tolerated procedure well with no complications   Sensory Level:  T4  Start Time:  7/26/2024 3:46 PM  Stop Time: 7/26/2024 3:47 PM       home

## 2024-08-14 ENCOUNTER — APPOINTMENT (OUTPATIENT)
Dept: UROLOGY | Facility: CLINIC | Age: 69
End: 2024-08-14
Payer: COMMERCIAL

## 2024-08-14 VITALS — DIASTOLIC BLOOD PRESSURE: 62 MMHG | SYSTOLIC BLOOD PRESSURE: 104 MMHG | HEART RATE: 62 BPM

## 2024-08-14 DIAGNOSIS — R31.9 HEMATURIA, UNSPECIFIED: ICD-10-CM

## 2024-08-14 DIAGNOSIS — M79.18 MYALGIA, OTHER SITE: ICD-10-CM

## 2024-08-14 PROCEDURE — 99204 OFFICE O/P NEW MOD 45 MIN: CPT

## 2024-08-14 NOTE — HISTORY OF PRESENT ILLNESS
[FreeTextEntry1] : Pleasure to see Dr. Gant once again.  The patient is now retired and her daughter just got .  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  The patient has very few symptoms and finds that her degree of pelvic pressure is improved significantly with diazepam suppositories which have been used at 5 mg on an every other night basis.  The patient has also been working with physical therapy, mainly external work with mobilization of anterior abdominal wall scar.  The patient has found Kegel maneuvers that are being performed during sessions to worsen symptoms while relaxation strategies decrease her discomfort.  We discussed the need for myofascial release and another prescription for physical therapy was placed.  I suspect with more aggressive physical therapy along with techniques of pelvic floor relaxation she will likely be able to come down on her diazepam dosing even further.  In the meantime, the patient does have a history of hematuria and I suggested follow-up renal ultrasonography urinalysis and cytology.  She will be calling for all of those results after they are performed and will be following up in the office otherwise in 6 months to 1 year.

## 2024-08-15 LAB
APPEARANCE: CLEAR
BACTERIA: NEGATIVE /HPF
BILIRUBIN URINE: NEGATIVE
BLOOD URINE: NEGATIVE
CAST: 1 /LPF
COLOR: YELLOW
EPITHELIAL CELLS: 0 /HPF
GLUCOSE QUALITATIVE U: NEGATIVE MG/DL
KETONES URINE: NEGATIVE MG/DL
LEUKOCYTE ESTERASE URINE: NEGATIVE
MICROSCOPIC-UA: NORMAL
NITRITE URINE: NEGATIVE
PH URINE: 6
PROTEIN URINE: NEGATIVE MG/DL
RED BLOOD CELLS URINE: 2 /HPF
SPECIFIC GRAVITY URINE: 1.02
UROBILINOGEN URINE: 0.2 MG/DL
WHITE BLOOD CELLS URINE: 0 /HPF

## 2024-08-16 LAB — URINE CYTOLOGY: NORMAL

## 2024-09-23 ENCOUNTER — APPOINTMENT (OUTPATIENT)
Dept: ENDOCRINOLOGY | Facility: CLINIC | Age: 69
End: 2024-09-23
Payer: COMMERCIAL

## 2024-09-23 VITALS
SYSTOLIC BLOOD PRESSURE: 118 MMHG | OXYGEN SATURATION: 99 % | DIASTOLIC BLOOD PRESSURE: 52 MMHG | BODY MASS INDEX: 19.3 KG/M2 | HEIGHT: 59.6 IN | WEIGHT: 97 LBS | HEART RATE: 50 BPM

## 2024-09-23 DIAGNOSIS — E03.9 HYPOTHYROIDISM, UNSPECIFIED: ICD-10-CM

## 2024-09-23 DIAGNOSIS — M81.0 AGE-RELATED OSTEOPOROSIS W/OUT CURRENT PATHOLOGICAL FRACTURE: ICD-10-CM

## 2024-09-23 DIAGNOSIS — M41.9 SCOLIOSIS, UNSPECIFIED: ICD-10-CM

## 2024-09-23 PROCEDURE — 77080 DXA BONE DENSITY AXIAL: CPT

## 2024-09-23 PROCEDURE — 99244 OFF/OP CNSLTJ NEW/EST MOD 40: CPT

## 2024-09-23 PROCEDURE — ZZZZZ: CPT

## 2024-09-23 RX ADMIN — ZOLEDRONIC ACID 5 MG/100ML: 5 INJECTION INTRAVENOUS at 00:00

## 2024-09-23 NOTE — PHYSICAL EXAM
[Alert] : alert [Well Nourished] : well nourished [No Acute Distress] : no acute distress [Well Developed] : well developed [Normal Sclera/Conjunctiva] : normal sclera/conjunctiva [EOMI] : extra ocular movement intact [No Proptosis] : no proptosis [Normal Oropharynx] : the oropharynx was normal [Thyroid Not Enlarged] : the thyroid was not enlarged [No Thyroid Nodules] : no palpable thyroid nodules [No Respiratory Distress] : no respiratory distress [No Accessory Muscle Use] : no accessory muscle use [Clear to Auscultation] : lungs were clear to auscultation bilaterally [Normal S1, S2] : normal S1 and S2 [Normal Rate] : heart rate was normal [Regular Rhythm] : with a regular rhythm [No Edema] : no peripheral edema [Normal Bowel Sounds] : normal bowel sounds [Not Tender] : non-tender [Not Distended] : not distended [Soft] : abdomen soft [Normal Anterior Cervical Nodes] : no anterior cervical lymphadenopathy [No Spinal Tenderness] : no spinal tenderness [Scoliosis] : scoliosis present [No Stigmata of Cushings Syndrome] : no stigmata of Cushings Syndrome [Normal Gait] : normal gait [Normal Strength/Tone] : muscle strength and tone were normal [Normal Reflexes] : deep tendon reflexes were 2+ and symmetric [No Tremors] : no tremors [Oriented x3] : oriented to person, place, and time [de-identified] : Mild scoliosis

## 2024-09-23 NOTE — CONSULT LETTER
[Dear  ___] : Dear  [unfilled], [Consult Letter:] : I had the pleasure of evaluating your patient, [unfilled]. [Please see my note below.] : Please see my note below. [Consult Closing:] : Thank you very much for allowing me to participate in the care of this patient.  If you have any questions, please do not hesitate to contact me. [Sincerely,] : Sincerely, [FreeTextEntry2] : Dr. Emelyn Lopez MD  [address] [place], [address] [phone] [FreeTextEntry3] : Dr. Chencho Kemp MD

## 2024-09-23 NOTE — ASSESSMENT
[FreeTextEntry1] :  68 y/o female is referred for initial consultation for osteoporosis.   Patient has been told of low bone density or osteoporosis in the distant past. Pt is generally in overall good health. Pt was on Fosamax in the past about 10 years ago. Pt took Fosamax for 2 years then stopped due to concern about esophageal cancer. Pt has a family hx of osteoporosis and hip fractures (mother age 50-60). Pt had a dose of IV Reclast December 2023, tolerated well. BMD 09/23/2024 shows: Comparison to outside study 2022. Spine: L1-2, -1.4, osteopenia, cannot directly compare to prior report. Total hip: -2.7, osteoporosis, prior report -2.7. Femoral neck: -3.4, osteoporosis, prior report -2.9. Proximal radius: -0.8, normal, no prior report. BMD results were discussed with the patient.   Given the patient's history and BMD, pt would like to continue with getting IV Reclast as has tolerated in the past. Recommend pt to get another dose of IV Reclast. All questions were answered. Rx information handout provided. Referred to rheumatology infusion center. Consent signed. Exam notable for minimal scoliosis. Hypothyroidism: clinically euthyroid on levothyroxine 50 mcg daily. No sx of hyper or hypothyroidism. No local neck pain. No dysphagia or dysphonia. No raciness, shakiness, heat/cold intolerance, tiredness, or fatigue. No palpitations, tremors, or sudden weight gain/loss.   I discussed that weight bearing exercises, cardiovascular activities, and diet are beneficial to overall health, but are not adequate for bone density increase or alternative to osteoporosis medication. I recommend supplementing with no more than 500 mg of Ca and 1000 IU of Vit D3.   Draw labs today including CMP, PTH, Vit D, and CTx.    Follow up in 1 year and repeat BMD.  Sammy MS, Kay SL, Erica KL, Brenda EM, Jennifer KG,  AJ, Margie ES. The clinician's guide to prevention and treatment of osteoporosis. Osteoporos Int. 2022 Oct;33(10):3000-3864. .

## 2024-09-23 NOTE — HISTORY OF PRESENT ILLNESS
[FreeTextEntry1] : Retired endocrinologist. Patient has been told of low bone density or osteoporosis in the distant past. Pt is generally in overall good health. Pt was on Fosamax in the past about 10 years ago. Pt took Fosamax for 2 years then stopped due to concern about esophageal cancer. Pt has a family hx of osteoporosis and hip fractures (mother age 50-60). Pt had a dose of IV Reclast December 2023, tolerated well. BMD 09/23/2024 shows: Comparison to outside study 2022. Spine: L1-2, -1.4, osteopenia, cannot directly compare to prior report. Total hip: -2.7, osteoporosis, prior report -2.7. Femoral neck: -3.4, osteoporosis, prior report -2.9. Proximal radius: -0.8, normal, no prior report. BMD results were discussed with the patient.   Hypothyroidism: clinically euthyroid on levothyroxine 50 mcg daily. No sx of hyper or hypothyroidism. No local neck pain. No dysphagia or dysphonia. No raciness, shakiness, heat/cold intolerance, tiredness, or fatigue. No palpitations, tremors, or sudden weight gain/loss.  Patient is post-menopausal since age 56. No HRT.  Pt has no Hx of kidney stones or calcium issues. No Hx of anorexia nervosa, premature menopause, amenorrhea during reproductive years. No h/o celiac disease, malabsorption, nutritional deficiencies.  No ulcers or bleeding. No other unusual risks for osteoporosis such as FHx hip fracture, suggestion of OI. No Hx of RT. No chronic prednisone use. No Hx of Paget's disease. No Hx of DVT or PE. Denies EtOH use, excessive soda intake, unusual dietary restrictions. Up to date with routine care.   Never smoker.   PMHx: Hypothyroidism, Hyperlipidemia  PSHx: Hysterectomy 09/20.    Sees DDS every 6 months. No major dental work planned.   Labs: December 2023 Creatinine: 0.70 Calcium: 9.7 Vitamin D: 38

## 2024-09-23 NOTE — CONSULT LETTER
[Dear  ___] : Dear  [unfilled], [Consult Letter:] : I had the pleasure of evaluating your patient, [unfilled]. [Please see my note below.] : Please see my note below. [Consult Closing:] : Thank you very much for allowing me to participate in the care of this patient.  If you have any questions, please do not hesitate to contact me. [Sincerely,] : Sincerely, [FreeTextEntry2] : Dr. Emelyn Lopez MD  [address] [facility] [place], [address] [phone] [FreeTextEntry3] : Dr. Chencho Kemp MD

## 2024-09-23 NOTE — PHYSICAL EXAM
[Alert] : alert [Well Nourished] : well nourished [No Acute Distress] : no acute distress [Well Developed] : well developed [Normal Sclera/Conjunctiva] : normal sclera/conjunctiva [EOMI] : extra ocular movement intact [No Proptosis] : no proptosis [Normal Oropharynx] : the oropharynx was normal [Thyroid Not Enlarged] : the thyroid was not enlarged [No Thyroid Nodules] : no palpable thyroid nodules [No Respiratory Distress] : no respiratory distress [No Accessory Muscle Use] : no accessory muscle use [Clear to Auscultation] : lungs were clear to auscultation bilaterally [Normal S1, S2] : normal S1 and S2 [Normal Rate] : heart rate was normal [Regular Rhythm] : with a regular rhythm [No Edema] : no peripheral edema [Normal Bowel Sounds] : normal bowel sounds [Not Tender] : non-tender [Not Distended] : not distended [Soft] : abdomen soft [Normal Anterior Cervical Nodes] : no anterior cervical lymphadenopathy [No Spinal Tenderness] : no spinal tenderness [Scoliosis] : scoliosis present [No Stigmata of Cushings Syndrome] : no stigmata of Cushings Syndrome [Normal Gait] : normal gait [Normal Strength/Tone] : muscle strength and tone were normal [Normal Reflexes] : deep tendon reflexes were 2+ and symmetric [No Tremors] : no tremors [Oriented x3] : oriented to person, place, and time [de-identified] : Mild scoliosis

## 2024-09-23 NOTE — END OF VISIT
[FreeTextEntry3] :  This note was written by Amelia Yadav on (September 23, 2024) acting as a medical scribe for Dr. Kemp This note was authored by the medical scribe for me. I have reviewed, edited, and revised the note as needed. I am in agreement with the exam findings, imaging findings, and treatment plan.  Chencho Kemp MD

## 2024-09-23 NOTE — ASSESSMENT
[FreeTextEntry1] :  70 y/o female is referred for initial consultation for osteoporosis.   Patient has been told of low bone density or osteoporosis in the distant past. Pt is generally in overall good health. Pt was on Fosamax in the past about 10 years ago. Pt took Fosamax for 2 years then stopped due to concern about esophageal cancer. Pt has a family hx of osteoporosis and hip fractures (mother age 50-60). Pt had a dose of IV Reclast December 2023, tolerated well. BMD 09/23/2024 shows: Comparison to outside study 2022. Spine: L1-2, -1.4, osteopenia, cannot directly compare to prior report. Total hip: -2.7, osteoporosis, prior report -2.7. Femoral neck: -3.4, osteoporosis, prior report -2.9. Proximal radius: -0.8, normal, no prior report. BMD results were discussed with the patient.   Given the patient's history and BMD, pt would like to continue with getting IV Reclast as has tolerated in the past. Recommend pt to get another dose of IV Reclast. All questions were answered. Rx information handout provided. Referred to rheumatology infusion center. Consent signed. Exam notable for minimal scoliosis. Hypothyroidism: clinically euthyroid on levothyroxine 50 mcg daily. No sx of hyper or hypothyroidism. No local neck pain. No dysphagia or dysphonia. No raciness, shakiness, heat/cold intolerance, tiredness, or fatigue. No palpitations, tremors, or sudden weight gain/loss.   I discussed that weight bearing exercises, cardiovascular activities, and diet are beneficial to overall health, but are not adequate for bone density increase or alternative to osteoporosis medication. I recommend supplementing with no more than 500 mg of Ca and 1000 IU of Vit D3.   Draw labs today including CMP, PTH, Vit D, and CTx.    Follow up in 1 year and repeat BMD.  Sammy MS, Kay SL, Erica KL, Brenda EM, Jennifer KG,  AJ, Margie ES. The clinician's guide to prevention and treatment of osteoporosis. Osteoporos Int. 2022 Oct;33(10):8863-9146. .

## 2024-09-23 NOTE — REASON FOR VISIT
[Consultation] : a consultation visit [Osteoporosis] : osteoporosis [FreeTextEntry2] : Dr. Emelyn Lopez MD

## 2024-09-23 NOTE — PROCEDURE
[FreeTextEntry1] : Bone Mineral Density: 09/23/2024 Indication: Comparison to outside study 2022 Spine: L1-2, -1.4, osteopenia, cannot directly compare to prior report  Total hip: -2.7, osteoporosis, prior report -2.7 Femoral neck: -3.4, osteoporosis, prior report -2.9 Proximal radius: -0.8, normal, no prior report

## 2024-09-24 LAB
ALBUMIN SERPL ELPH-MCNC: 5 G/DL
ALP BLD-CCNC: 69 U/L
ALT SERPL-CCNC: 22 U/L
ANION GAP SERPL CALC-SCNC: 17 MMOL/L
AST SERPL-CCNC: 24 U/L
BILIRUB SERPL-MCNC: 0.4 MG/DL
BUN SERPL-MCNC: 15 MG/DL
CALCIUM SERPL-MCNC: 10.4 MG/DL
CALCIUM SERPL-MCNC: 10.4 MG/DL
CHLORIDE SERPL-SCNC: 101 MMOL/L
CO2 SERPL-SCNC: 25 MMOL/L
CREAT SERPL-MCNC: 0.72 MG/DL
EGFR: 90 ML/MIN/1.73M2
GLUCOSE SERPL-MCNC: 91 MG/DL
PARATHYROID HORMONE INTACT: 37 PG/ML
PHOSPHATE SERPL-MCNC: 3.6 MG/DL
POTASSIUM SERPL-SCNC: 5.2 MMOL/L
PROT SERPL-MCNC: 7.8 G/DL
SODIUM SERPL-SCNC: 143 MMOL/L

## 2024-10-02 LAB — COLLAGEN CTX SERPL-MCNC: 66 PG/ML

## 2024-10-04 ENCOUNTER — APPOINTMENT (OUTPATIENT)
Dept: INTERNAL MEDICINE | Facility: CLINIC | Age: 69
End: 2024-10-04
Payer: COMMERCIAL

## 2024-10-04 DIAGNOSIS — Z23 ENCOUNTER FOR IMMUNIZATION: ICD-10-CM

## 2024-10-04 PROCEDURE — G0008: CPT

## 2024-10-04 PROCEDURE — 90662 IIV NO PRSV INCREASED AG IM: CPT

## 2024-11-05 ENCOUNTER — TRANSCRIPTION ENCOUNTER (OUTPATIENT)
Age: 69
End: 2024-11-05

## 2024-11-15 ENCOUNTER — NON-APPOINTMENT (OUTPATIENT)
Age: 69
End: 2024-11-15

## 2024-11-15 ENCOUNTER — APPOINTMENT (OUTPATIENT)
Dept: INTERNAL MEDICINE | Facility: CLINIC | Age: 69
End: 2024-11-15
Payer: COMMERCIAL

## 2024-11-15 VITALS
DIASTOLIC BLOOD PRESSURE: 68 MMHG | HEIGHT: 59.6 IN | WEIGHT: 98 LBS | TEMPERATURE: 97.9 F | SYSTOLIC BLOOD PRESSURE: 107 MMHG | OXYGEN SATURATION: 97 % | HEART RATE: 66 BPM | BODY MASS INDEX: 19.49 KG/M2

## 2024-11-15 DIAGNOSIS — Z00.00 ENCOUNTER FOR GENERAL ADULT MEDICAL EXAMINATION W/OUT ABNORMAL FINDINGS: ICD-10-CM

## 2024-11-15 DIAGNOSIS — R01.1 CARDIAC MURMUR, UNSPECIFIED: ICD-10-CM

## 2024-11-15 DIAGNOSIS — K82.4 CHOLESTEROLOSIS OF GALLBLADDER: ICD-10-CM

## 2024-11-15 DIAGNOSIS — M79.18 MYALGIA, OTHER SITE: ICD-10-CM

## 2024-11-15 DIAGNOSIS — D18.03 HEMANGIOMA OF INTRA-ABDOMINAL STRUCTURES: ICD-10-CM

## 2024-11-15 DIAGNOSIS — I35.1 NONRHEUMATIC AORTIC (VALVE) INSUFFICIENCY: ICD-10-CM

## 2024-11-15 DIAGNOSIS — E03.9 HYPOTHYROIDISM, UNSPECIFIED: ICD-10-CM

## 2024-11-15 PROCEDURE — 36415 COLL VENOUS BLD VENIPUNCTURE: CPT

## 2024-11-15 PROCEDURE — 93000 ELECTROCARDIOGRAM COMPLETE: CPT | Mod: 59

## 2024-11-15 PROCEDURE — 99387 INIT PM E/M NEW PAT 65+ YRS: CPT

## 2024-11-18 ENCOUNTER — TRANSCRIPTION ENCOUNTER (OUTPATIENT)
Age: 69
End: 2024-11-18

## 2024-11-18 LAB
25(OH)D3 SERPL-MCNC: 50.1 NG/ML
ALBUMIN SERPL ELPH-MCNC: 4.4 G/DL
ALP BLD-CCNC: 63 U/L
ALT SERPL-CCNC: 23 U/L
ANION GAP SERPL CALC-SCNC: 13 MMOL/L
AST SERPL-CCNC: 22 U/L
BILIRUB SERPL-MCNC: 0.2 MG/DL
BUN SERPL-MCNC: 21 MG/DL
CALCIUM SERPL-MCNC: 9.8 MG/DL
CHLORIDE SERPL-SCNC: 103 MMOL/L
CHOLEST SERPL-MCNC: 175 MG/DL
CO2 SERPL-SCNC: 25 MMOL/L
CREAT SERPL-MCNC: 0.67 MG/DL
EGFR: 95 ML/MIN/1.73M2
ESTIMATED AVERAGE GLUCOSE: 108 MG/DL
GLUCOSE SERPL-MCNC: 90 MG/DL
HBA1C MFR BLD HPLC: 5.4 %
HCT VFR BLD CALC: 37.2 %
HDLC SERPL-MCNC: 93 MG/DL
HGB BLD-MCNC: 12.1 G/DL
LDLC SERPL CALC-MCNC: 73 MG/DL
MCHC RBC-ENTMCNC: 29.1 PG
MCHC RBC-ENTMCNC: 32.5 G/DL
MCV RBC AUTO: 89.4 FL
NONHDLC SERPL-MCNC: 82 MG/DL
PLATELET # BLD AUTO: 218 K/UL
POTASSIUM SERPL-SCNC: 4.9 MMOL/L
PROT SERPL-MCNC: 6.9 G/DL
RBC # BLD: 4.16 M/UL
RBC # FLD: 13.7 %
SODIUM SERPL-SCNC: 141 MMOL/L
TRIGL SERPL-MCNC: 44 MG/DL
TSH SERPL-ACNC: 1.58 UIU/ML
WBC # FLD AUTO: 6.6 K/UL

## 2024-12-01 ENCOUNTER — NON-APPOINTMENT (OUTPATIENT)
Age: 69
End: 2024-12-01

## 2024-12-06 ENCOUNTER — APPOINTMENT (OUTPATIENT)
Dept: CARDIOLOGY | Facility: CLINIC | Age: 69
End: 2024-12-06
Payer: COMMERCIAL

## 2024-12-06 PROCEDURE — 93306 TTE W/DOPPLER COMPLETE: CPT

## 2024-12-16 ENCOUNTER — APPOINTMENT (OUTPATIENT)
Dept: ULTRASOUND IMAGING | Facility: CLINIC | Age: 69
End: 2024-12-16
Payer: COMMERCIAL

## 2024-12-16 PROCEDURE — 76770 US EXAM ABDO BACK WALL COMP: CPT

## 2024-12-23 ENCOUNTER — APPOINTMENT (OUTPATIENT)
Dept: RHEUMATOLOGY | Facility: CLINIC | Age: 69
End: 2024-12-23
Payer: COMMERCIAL

## 2024-12-23 PROCEDURE — 96374 THER/PROPH/DIAG INJ IV PUSH: CPT

## 2025-01-01 ENCOUNTER — NON-APPOINTMENT (OUTPATIENT)
Age: 70
End: 2025-01-01

## 2025-01-06 ENCOUNTER — APPOINTMENT (OUTPATIENT)
Dept: CARDIOLOGY | Facility: CLINIC | Age: 70
End: 2025-01-06
Payer: COMMERCIAL

## 2025-01-06 VITALS
OXYGEN SATURATION: 98 % | SYSTOLIC BLOOD PRESSURE: 110 MMHG | DIASTOLIC BLOOD PRESSURE: 70 MMHG | WEIGHT: 96.38 LBS | BODY MASS INDEX: 19.08 KG/M2 | HEART RATE: 62 BPM

## 2025-01-06 DIAGNOSIS — I35.1 NONRHEUMATIC AORTIC (VALVE) INSUFFICIENCY: ICD-10-CM

## 2025-01-06 PROCEDURE — 99203 OFFICE O/P NEW LOW 30 MIN: CPT

## 2025-01-17 ENCOUNTER — RX RENEWAL (OUTPATIENT)
Age: 70
End: 2025-01-17

## 2025-02-10 ENCOUNTER — APPOINTMENT (OUTPATIENT)
Dept: ULTRASOUND IMAGING | Facility: CLINIC | Age: 70
End: 2025-02-10
Payer: COMMERCIAL

## 2025-02-10 ENCOUNTER — RESULT REVIEW (OUTPATIENT)
Age: 70
End: 2025-02-10

## 2025-02-10 ENCOUNTER — APPOINTMENT (OUTPATIENT)
Dept: MAMMOGRAPHY | Facility: CLINIC | Age: 70
End: 2025-02-10
Payer: COMMERCIAL

## 2025-02-10 PROCEDURE — 76641 ULTRASOUND BREAST COMPLETE: CPT | Mod: 50

## 2025-02-10 PROCEDURE — 77067 SCR MAMMO BI INCL CAD: CPT

## 2025-02-10 PROCEDURE — 77063 BREAST TOMOSYNTHESIS BI: CPT

## 2025-02-10 PROCEDURE — 76700 US EXAM ABDOM COMPLETE: CPT

## 2025-03-04 ENCOUNTER — NON-APPOINTMENT (OUTPATIENT)
Age: 70
End: 2025-03-04

## 2025-03-17 ENCOUNTER — RX RENEWAL (OUTPATIENT)
Age: 70
End: 2025-03-17

## 2025-05-05 ENCOUNTER — TRANSCRIPTION ENCOUNTER (OUTPATIENT)
Age: 70
End: 2025-05-05

## 2025-06-09 ENCOUNTER — RX RENEWAL (OUTPATIENT)
Age: 70
End: 2025-06-09

## 2025-07-02 PROBLEM — Z20.9 EXPOSURE TO COMMUNICABLE DISEASE: Status: RESOLVED | Noted: 2019-01-05 | Resolved: 2025-07-02

## 2025-07-23 ENCOUNTER — NON-APPOINTMENT (OUTPATIENT)
Age: 70
End: 2025-07-23

## 2025-07-25 ENCOUNTER — APPOINTMENT (OUTPATIENT)
Dept: UROLOGY | Facility: CLINIC | Age: 70
End: 2025-07-25
Payer: COMMERCIAL

## 2025-07-25 VITALS — DIASTOLIC BLOOD PRESSURE: 65 MMHG | SYSTOLIC BLOOD PRESSURE: 115 MMHG | OXYGEN SATURATION: 98 % | HEART RATE: 58 BPM

## 2025-07-25 DIAGNOSIS — M79.18 MYALGIA, OTHER SITE: ICD-10-CM

## 2025-07-25 DIAGNOSIS — R31.9 HEMATURIA, UNSPECIFIED: ICD-10-CM

## 2025-07-25 DIAGNOSIS — N95.2 POSTMENOPAUSAL ATROPHIC VAGINITIS: ICD-10-CM

## 2025-07-25 PROCEDURE — 99215 OFFICE O/P EST HI 40 MIN: CPT

## 2025-07-25 RX ORDER — CONJUGATED ESTROGENS 0.62 MG/G
0.62 CREAM VAGINAL
Qty: 2 | Refills: 3 | Status: ACTIVE | COMMUNITY
Start: 2025-07-25 | End: 1900-01-01

## 2025-07-28 LAB
APPEARANCE: CLEAR
BACTERIA UR CULT: NORMAL
BACTERIA: NEGATIVE /HPF
BILIRUBIN URINE: NEGATIVE
BLOOD URINE: NEGATIVE
CAST: 0 /LPF
COLOR: YELLOW
EPITHELIAL CELLS: 0 /HPF
GLUCOSE QUALITATIVE U: NEGATIVE MG/DL
KETONES URINE: NEGATIVE MG/DL
LEUKOCYTE ESTERASE URINE: ABNORMAL
MICROSCOPIC-UA: NORMAL
NITRITE URINE: NEGATIVE
PH URINE: 6.5
PROTEIN URINE: NEGATIVE MG/DL
RED BLOOD CELLS URINE: 0 /HPF
SPECIFIC GRAVITY URINE: 1.01
UROBILINOGEN URINE: 0.2 MG/DL
WHITE BLOOD CELLS URINE: 0 /HPF

## 2025-07-28 RX ORDER — ESTRADIOL 0.1 MG/G
0.1 CREAM VAGINAL
Qty: 2 | Refills: 2 | Status: ACTIVE | COMMUNITY
Start: 2025-07-28 | End: 1900-01-01